# Patient Record
Sex: MALE | Race: WHITE | NOT HISPANIC OR LATINO | Employment: OTHER | URBAN - METROPOLITAN AREA
[De-identification: names, ages, dates, MRNs, and addresses within clinical notes are randomized per-mention and may not be internally consistent; named-entity substitution may affect disease eponyms.]

---

## 2018-02-06 ENCOUNTER — RECORDS - HEALTHEAST (OUTPATIENT)
Dept: LAB | Facility: CLINIC | Age: 83
End: 2018-02-06

## 2018-02-07 LAB
ANION GAP SERPL CALCULATED.3IONS-SCNC: 8 MMOL/L (ref 5–18)
BUN SERPL-MCNC: 12 MG/DL (ref 8–28)
CALCIUM SERPL-MCNC: 8.7 MG/DL (ref 8.5–10.5)
CHLORIDE BLD-SCNC: 103 MMOL/L (ref 98–107)
CO2 SERPL-SCNC: 25 MMOL/L (ref 22–31)
CREAT SERPL-MCNC: 0.77 MG/DL (ref 0.7–1.3)
GFR SERPL CREATININE-BSD FRML MDRD: >60 ML/MIN/1.73M2
GLUCOSE BLD-MCNC: 95 MG/DL (ref 70–125)
POTASSIUM BLD-SCNC: 4.5 MMOL/L (ref 3.5–5)
SODIUM SERPL-SCNC: 136 MMOL/L (ref 136–145)

## 2018-09-05 ENCOUNTER — RECORDS - HEALTHEAST (OUTPATIENT)
Dept: LAB | Facility: CLINIC | Age: 83
End: 2018-09-05

## 2018-09-05 LAB
ANION GAP SERPL CALCULATED.3IONS-SCNC: 8 MMOL/L (ref 5–18)
BUN SERPL-MCNC: 15 MG/DL (ref 8–28)
CALCIUM SERPL-MCNC: 9.2 MG/DL (ref 8.5–10.5)
CHLORIDE BLD-SCNC: 103 MMOL/L (ref 98–107)
CO2 SERPL-SCNC: 29 MMOL/L (ref 22–31)
CREAT SERPL-MCNC: 0.94 MG/DL (ref 0.7–1.3)
GFR SERPL CREATININE-BSD FRML MDRD: >60 ML/MIN/1.73M2
GLUCOSE BLD-MCNC: 151 MG/DL (ref 70–125)
POTASSIUM BLD-SCNC: 4.2 MMOL/L (ref 3.5–5)
SODIUM SERPL-SCNC: 140 MMOL/L (ref 136–145)

## 2019-11-08 ENCOUNTER — RECORDS - HEALTHEAST (OUTPATIENT)
Dept: LAB | Facility: CLINIC | Age: 84
End: 2019-11-08

## 2019-11-08 LAB — INR PPP: 1.92 (ref 0.9–1.1)

## 2019-11-11 ENCOUNTER — RECORDS - HEALTHEAST (OUTPATIENT)
Dept: LAB | Facility: CLINIC | Age: 84
End: 2019-11-11

## 2019-11-11 LAB — INR PPP: 1.97 (ref 0.9–1.1)

## 2019-11-20 ENCOUNTER — RECORDS - HEALTHEAST (OUTPATIENT)
Dept: LAB | Facility: CLINIC | Age: 84
End: 2019-11-20

## 2019-11-20 LAB — INR PPP: 2.11 (ref 0.9–1.1)

## 2019-12-04 ENCOUNTER — RECORDS - HEALTHEAST (OUTPATIENT)
Dept: LAB | Facility: CLINIC | Age: 84
End: 2019-12-04

## 2019-12-04 LAB — INR PPP: 2.02 (ref 0.9–1.1)

## 2019-12-18 ENCOUNTER — RECORDS - HEALTHEAST (OUTPATIENT)
Dept: LAB | Facility: CLINIC | Age: 84
End: 2019-12-18

## 2019-12-18 LAB — INR PPP: 2.23 (ref 0.9–1.1)

## 2020-01-02 ENCOUNTER — RECORDS - HEALTHEAST (OUTPATIENT)
Dept: LAB | Facility: CLINIC | Age: 85
End: 2020-01-02

## 2020-01-02 LAB — INR PPP: 2.29 (ref 0.9–1.1)

## 2020-01-28 ENCOUNTER — RECORDS - HEALTHEAST (OUTPATIENT)
Dept: LAB | Facility: CLINIC | Age: 85
End: 2020-01-28

## 2020-01-29 LAB — INR PPP: 2.07 (ref 0.9–1.1)

## 2020-02-26 ENCOUNTER — RECORDS - HEALTHEAST (OUTPATIENT)
Dept: LAB | Facility: CLINIC | Age: 85
End: 2020-02-26

## 2020-02-26 LAB — INR PPP: 2.76 (ref 0.9–1.1)

## 2020-03-17 ENCOUNTER — RECORDS - HEALTHEAST (OUTPATIENT)
Dept: LAB | Facility: CLINIC | Age: 85
End: 2020-03-17

## 2020-03-18 LAB
ANION GAP SERPL CALCULATED.3IONS-SCNC: 7 MMOL/L (ref 5–18)
BUN SERPL-MCNC: 14 MG/DL (ref 8–28)
CALCIUM SERPL-MCNC: 8.6 MG/DL (ref 8.5–10.5)
CHLORIDE BLD-SCNC: 104 MMOL/L (ref 98–107)
CO2 SERPL-SCNC: 27 MMOL/L (ref 22–31)
CREAT SERPL-MCNC: 0.97 MG/DL (ref 0.7–1.3)
GFR SERPL CREATININE-BSD FRML MDRD: >60 ML/MIN/1.73M2
GLUCOSE BLD-MCNC: 111 MG/DL (ref 70–125)
POTASSIUM BLD-SCNC: 4.4 MMOL/L (ref 3.5–5)
SODIUM SERPL-SCNC: 138 MMOL/L (ref 136–145)

## 2020-03-24 ENCOUNTER — RECORDS - HEALTHEAST (OUTPATIENT)
Dept: LAB | Facility: CLINIC | Age: 85
End: 2020-03-24

## 2020-03-25 LAB — INR PPP: 2.72 (ref 0.9–1.1)

## 2020-04-08 ENCOUNTER — RECORDS - HEALTHEAST (OUTPATIENT)
Dept: LAB | Facility: CLINIC | Age: 85
End: 2020-04-08

## 2020-04-08 LAB — INR PPP: 2.43 (ref 0.9–1.1)

## 2020-05-06 ENCOUNTER — RECORDS - HEALTHEAST (OUTPATIENT)
Dept: LAB | Facility: CLINIC | Age: 85
End: 2020-05-06

## 2020-05-06 LAB — INR PPP: 3.48 (ref 0.9–1.1)

## 2020-05-13 ENCOUNTER — RECORDS - HEALTHEAST (OUTPATIENT)
Dept: LAB | Facility: CLINIC | Age: 85
End: 2020-05-13

## 2020-05-13 LAB — INR PPP: 2.38 (ref 0.9–1.1)

## 2020-05-26 ENCOUNTER — RECORDS - HEALTHEAST (OUTPATIENT)
Dept: LAB | Facility: CLINIC | Age: 85
End: 2020-05-26

## 2020-05-27 LAB — INR PPP: 2.52 (ref 0.9–1.1)

## 2020-06-10 ENCOUNTER — RECORDS - HEALTHEAST (OUTPATIENT)
Dept: LAB | Facility: CLINIC | Age: 85
End: 2020-06-10

## 2020-06-10 LAB — INR PPP: 2.27 (ref 0.9–1.1)

## 2020-06-23 ENCOUNTER — RECORDS - HEALTHEAST (OUTPATIENT)
Dept: LAB | Facility: CLINIC | Age: 85
End: 2020-06-23

## 2020-06-24 LAB — INR PPP: 2.68 (ref 0.9–1.1)

## 2020-07-21 ENCOUNTER — RECORDS - HEALTHEAST (OUTPATIENT)
Dept: LAB | Facility: CLINIC | Age: 85
End: 2020-07-21

## 2020-07-22 LAB — INR PPP: 2.41 (ref 0.9–1.1)

## 2020-08-18 ENCOUNTER — RECORDS - HEALTHEAST (OUTPATIENT)
Dept: LAB | Facility: CLINIC | Age: 85
End: 2020-08-18

## 2020-08-19 LAB — INR PPP: 2.11 (ref 0.9–1.1)

## 2020-08-25 ENCOUNTER — RECORDS - HEALTHEAST (OUTPATIENT)
Dept: LAB | Facility: CLINIC | Age: 85
End: 2020-08-25

## 2020-08-26 LAB — INR PPP: 2.34 (ref 0.9–1.1)

## 2020-09-08 ENCOUNTER — RECORDS - HEALTHEAST (OUTPATIENT)
Dept: LAB | Facility: CLINIC | Age: 85
End: 2020-09-08

## 2020-09-09 LAB
ANION GAP SERPL CALCULATED.3IONS-SCNC: 8 MMOL/L (ref 5–18)
BUN SERPL-MCNC: 15 MG/DL (ref 8–28)
CALCIUM SERPL-MCNC: 8.6 MG/DL (ref 8.5–10.5)
CHLORIDE BLD-SCNC: 100 MMOL/L (ref 98–107)
CO2 SERPL-SCNC: 29 MMOL/L (ref 22–31)
CREAT SERPL-MCNC: 1 MG/DL (ref 0.7–1.3)
GFR SERPL CREATININE-BSD FRML MDRD: >60 ML/MIN/1.73M2
GLUCOSE BLD-MCNC: 107 MG/DL (ref 70–125)
INR PPP: 2.96 (ref 0.9–1.1)
POTASSIUM BLD-SCNC: 4.2 MMOL/L (ref 3.5–5)
SODIUM SERPL-SCNC: 137 MMOL/L (ref 136–145)

## 2020-09-22 ENCOUNTER — RECORDS - HEALTHEAST (OUTPATIENT)
Dept: LAB | Facility: CLINIC | Age: 85
End: 2020-09-22

## 2020-09-23 LAB — INR PPP: 3.54 (ref 0.9–1.1)

## 2020-09-29 ENCOUNTER — RECORDS - HEALTHEAST (OUTPATIENT)
Dept: LAB | Facility: CLINIC | Age: 85
End: 2020-09-29

## 2020-09-30 LAB — INR PPP: 2.53 (ref 0.9–1.1)

## 2020-10-03 ENCOUNTER — RECORDS - HEALTHEAST (OUTPATIENT)
Dept: LAB | Facility: CLINIC | Age: 85
End: 2020-10-03

## 2020-10-05 LAB — INR PPP: 2.02 (ref 0.9–1.1)

## 2020-10-09 ENCOUNTER — RECORDS - HEALTHEAST (OUTPATIENT)
Dept: LAB | Facility: CLINIC | Age: 85
End: 2020-10-09

## 2020-10-12 LAB — INR PPP: 2.24 (ref 0.9–1.1)

## 2020-10-16 ENCOUNTER — RECORDS - HEALTHEAST (OUTPATIENT)
Dept: LAB | Facility: CLINIC | Age: 85
End: 2020-10-16

## 2020-10-19 LAB — INR PPP: 2.64 (ref 0.9–1.1)

## 2020-11-10 ENCOUNTER — RECORDS - HEALTHEAST (OUTPATIENT)
Dept: LAB | Facility: CLINIC | Age: 85
End: 2020-11-10

## 2020-11-11 LAB — INR PPP: 2.79 (ref 0.9–1.1)

## 2020-11-23 ENCOUNTER — RECORDS - HEALTHEAST (OUTPATIENT)
Dept: LAB | Facility: CLINIC | Age: 85
End: 2020-11-23

## 2020-11-25 LAB — INR PPP: 3.35 (ref 0.9–1.1)

## 2020-12-01 ENCOUNTER — RECORDS - HEALTHEAST (OUTPATIENT)
Dept: LAB | Facility: CLINIC | Age: 85
End: 2020-12-01

## 2020-12-02 LAB — INR PPP: 2.57 (ref 0.9–1.1)

## 2020-12-08 ENCOUNTER — RECORDS - HEALTHEAST (OUTPATIENT)
Dept: LAB | Facility: CLINIC | Age: 85
End: 2020-12-08

## 2020-12-09 LAB — INR PPP: 2.64 (ref 0.9–1.1)

## 2020-12-15 ENCOUNTER — RECORDS - HEALTHEAST (OUTPATIENT)
Dept: LAB | Facility: CLINIC | Age: 85
End: 2020-12-15

## 2020-12-16 LAB — INR PPP: 2.19 (ref 0.9–1.1)

## 2021-01-14 ENCOUNTER — RECORDS - HEALTHEAST (OUTPATIENT)
Dept: LAB | Facility: CLINIC | Age: 86
End: 2021-01-14

## 2021-01-15 LAB — INR PPP: 2.08 (ref 0.9–1.1)

## 2021-02-05 ENCOUNTER — RECORDS - HEALTHEAST (OUTPATIENT)
Dept: LAB | Facility: CLINIC | Age: 86
End: 2021-02-05

## 2021-02-08 LAB
ANION GAP SERPL CALCULATED.3IONS-SCNC: 6 MMOL/L (ref 5–18)
BUN SERPL-MCNC: 19 MG/DL (ref 8–28)
CALCIUM SERPL-MCNC: 8.6 MG/DL (ref 8.5–10.5)
CHLORIDE BLD-SCNC: 101 MMOL/L (ref 98–107)
CO2 SERPL-SCNC: 28 MMOL/L (ref 22–31)
CREAT SERPL-MCNC: 1.08 MG/DL (ref 0.7–1.3)
GFR SERPL CREATININE-BSD FRML MDRD: >60 ML/MIN/1.73M2
GLUCOSE BLD-MCNC: 108 MG/DL (ref 70–125)
POTASSIUM BLD-SCNC: 4.4 MMOL/L (ref 3.5–5)
SODIUM SERPL-SCNC: 135 MMOL/L (ref 136–145)

## 2021-02-10 ENCOUNTER — RECORDS - HEALTHEAST (OUTPATIENT)
Dept: LAB | Facility: CLINIC | Age: 86
End: 2021-02-10

## 2021-02-12 LAB — INR PPP: 1.98 (ref 0.9–1.1)

## 2021-02-24 ENCOUNTER — RECORDS - HEALTHEAST (OUTPATIENT)
Dept: LAB | Facility: CLINIC | Age: 86
End: 2021-02-24

## 2021-02-26 LAB — INR PPP: 2.22 (ref 0.9–1.1)

## 2021-03-19 ENCOUNTER — RECORDS - HEALTHEAST (OUTPATIENT)
Dept: LAB | Facility: CLINIC | Age: 86
End: 2021-03-19

## 2021-03-19 LAB — INR PPP: 2.01 (ref 0.9–1.1)

## 2021-04-13 ENCOUNTER — RECORDS - HEALTHEAST (OUTPATIENT)
Dept: LAB | Facility: CLINIC | Age: 86
End: 2021-04-13

## 2021-04-14 LAB — INR PPP: 2.15 (ref 0.9–1.1)

## 2021-05-10 ENCOUNTER — RECORDS - HEALTHEAST (OUTPATIENT)
Dept: LAB | Facility: CLINIC | Age: 86
End: 2021-05-10

## 2021-05-12 LAB — INR PPP: 2.81 (ref 0.9–1.1)

## 2021-06-08 ENCOUNTER — RECORDS - HEALTHEAST (OUTPATIENT)
Dept: LAB | Facility: CLINIC | Age: 86
End: 2021-06-08

## 2021-06-09 LAB — INR PPP: 2.65 (ref 0.9–1.1)

## 2021-07-05 ENCOUNTER — RECORDS - HEALTHEAST (OUTPATIENT)
Dept: LAB | Facility: CLINIC | Age: 86
End: 2021-07-05

## 2021-07-07 LAB — INR PPP: 2.53 (ref 0.9–1.1)

## 2021-08-04 ENCOUNTER — LAB REQUISITION (OUTPATIENT)
Dept: LAB | Facility: CLINIC | Age: 86
End: 2021-08-04
Payer: COMMERCIAL

## 2021-08-04 LAB — INR PPP: 2.04 (ref 0.85–1.15)

## 2021-08-04 PROCEDURE — 85610 PROTHROMBIN TIME: CPT | Mod: ORL | Performed by: NURSE PRACTITIONER

## 2021-08-04 PROCEDURE — 36415 COLL VENOUS BLD VENIPUNCTURE: CPT | Mod: ORL | Performed by: NURSE PRACTITIONER

## 2021-08-04 PROCEDURE — P9604 ONE-WAY ALLOW PRORATED TRIP: HCPCS | Mod: ORL | Performed by: NURSE PRACTITIONER

## 2021-08-31 ENCOUNTER — LAB REQUISITION (OUTPATIENT)
Dept: LAB | Facility: CLINIC | Age: 86
End: 2021-08-31
Payer: COMMERCIAL

## 2021-08-31 DIAGNOSIS — I82.409 ACUTE EMBOLISM AND THROMBOSIS OF UNSPECIFIED DEEP VEINS OF UNSPECIFIED LOWER EXTREMITY (H): ICD-10-CM

## 2021-09-01 ENCOUNTER — LAB REQUISITION (OUTPATIENT)
Dept: LAB | Facility: CLINIC | Age: 86
End: 2021-09-01
Payer: COMMERCIAL

## 2021-09-01 DIAGNOSIS — Z51.81 ENCOUNTER FOR THERAPEUTIC DRUG LEVEL MONITORING: ICD-10-CM

## 2021-09-01 LAB
ANION GAP SERPL CALCULATED.3IONS-SCNC: 9 MMOL/L (ref 5–18)
BUN SERPL-MCNC: 21 MG/DL (ref 8–28)
CALCIUM SERPL-MCNC: 9.3 MG/DL (ref 8.5–10.5)
CHLORIDE BLD-SCNC: 102 MMOL/L (ref 98–107)
CO2 SERPL-SCNC: 29 MMOL/L (ref 22–31)
CREAT SERPL-MCNC: 1.14 MG/DL (ref 0.7–1.3)
ERYTHROCYTE [DISTWIDTH] IN BLOOD BY AUTOMATED COUNT: 12.6 % (ref 10–15)
GFR SERPL CREATININE-BSD FRML MDRD: 58 ML/MIN/1.73M2
GLUCOSE BLD-MCNC: 101 MG/DL (ref 70–125)
HCT VFR BLD AUTO: 42 % (ref 40–53)
HGB BLD-MCNC: 13.8 G/DL (ref 13.3–17.7)
INR PPP: 2.68 (ref 0.85–1.15)
MCH RBC QN AUTO: 32 PG (ref 26.5–33)
MCHC RBC AUTO-ENTMCNC: 32.9 G/DL (ref 31.5–36.5)
MCV RBC AUTO: 97 FL (ref 78–100)
PLATELET # BLD AUTO: 149 10E3/UL (ref 150–450)
POTASSIUM BLD-SCNC: 4.2 MMOL/L (ref 3.5–5)
RBC # BLD AUTO: 4.31 10E6/UL (ref 4.4–5.9)
SODIUM SERPL-SCNC: 140 MMOL/L (ref 136–145)
WBC # BLD AUTO: 6.8 10E3/UL (ref 4–11)

## 2021-09-01 PROCEDURE — P9604 ONE-WAY ALLOW PRORATED TRIP: HCPCS | Mod: ORL | Performed by: NURSE PRACTITIONER

## 2021-09-01 PROCEDURE — 80048 BASIC METABOLIC PNL TOTAL CA: CPT | Mod: ORL | Performed by: NURSE PRACTITIONER

## 2021-09-01 PROCEDURE — 85610 PROTHROMBIN TIME: CPT | Mod: ORL | Performed by: NURSE PRACTITIONER

## 2021-09-01 PROCEDURE — 85027 COMPLETE CBC AUTOMATED: CPT | Mod: ORL | Performed by: NURSE PRACTITIONER

## 2021-09-01 PROCEDURE — 36415 COLL VENOUS BLD VENIPUNCTURE: CPT | Mod: ORL | Performed by: NURSE PRACTITIONER

## 2021-09-18 ENCOUNTER — LAB REQUISITION (OUTPATIENT)
Dept: LAB | Facility: CLINIC | Age: 86
End: 2021-09-18
Payer: COMMERCIAL

## 2021-09-18 DIAGNOSIS — Z51.81 ENCOUNTER FOR THERAPEUTIC DRUG LEVEL MONITORING: ICD-10-CM

## 2021-09-28 ENCOUNTER — LAB REQUISITION (OUTPATIENT)
Dept: LAB | Facility: CLINIC | Age: 86
End: 2021-09-28
Payer: COMMERCIAL

## 2021-09-28 DIAGNOSIS — I82.90 ACUTE EMBOLISM AND THROMBOSIS OF UNSPECIFIED VEIN: ICD-10-CM

## 2021-09-29 LAB — INR PPP: 3.22 (ref 0.85–1.15)

## 2021-09-29 PROCEDURE — P9604 ONE-WAY ALLOW PRORATED TRIP: HCPCS | Mod: ORL | Performed by: NURSE PRACTITIONER

## 2021-09-29 PROCEDURE — 36415 COLL VENOUS BLD VENIPUNCTURE: CPT | Mod: ORL | Performed by: NURSE PRACTITIONER

## 2021-09-29 PROCEDURE — 85610 PROTHROMBIN TIME: CPT | Mod: ORL | Performed by: NURSE PRACTITIONER

## 2021-10-12 ENCOUNTER — LAB REQUISITION (OUTPATIENT)
Dept: LAB | Facility: CLINIC | Age: 86
End: 2021-10-12
Payer: COMMERCIAL

## 2021-10-12 DIAGNOSIS — I82.90 ACUTE EMBOLISM AND THROMBOSIS OF UNSPECIFIED VEIN: ICD-10-CM

## 2021-10-13 LAB — INR PPP: 2.44 (ref 0.85–1.15)

## 2021-10-13 PROCEDURE — 36415 COLL VENOUS BLD VENIPUNCTURE: CPT | Mod: ORL | Performed by: NURSE PRACTITIONER

## 2021-10-13 PROCEDURE — 85610 PROTHROMBIN TIME: CPT | Mod: ORL | Performed by: NURSE PRACTITIONER

## 2021-10-13 PROCEDURE — P9604 ONE-WAY ALLOW PRORATED TRIP: HCPCS | Mod: ORL | Performed by: NURSE PRACTITIONER

## 2021-10-25 ENCOUNTER — LAB REQUISITION (OUTPATIENT)
Dept: LAB | Facility: CLINIC | Age: 86
End: 2021-10-25
Payer: COMMERCIAL

## 2021-10-25 DIAGNOSIS — I82.90 ACUTE EMBOLISM AND THROMBOSIS OF UNSPECIFIED VEIN: ICD-10-CM

## 2021-10-25 DIAGNOSIS — I82.409 ACUTE EMBOLISM AND THROMBOSIS OF UNSPECIFIED DEEP VEINS OF UNSPECIFIED LOWER EXTREMITY (H): ICD-10-CM

## 2021-10-27 LAB — INR PPP: 1.55 (ref 0.85–1.15)

## 2021-10-27 PROCEDURE — P9604 ONE-WAY ALLOW PRORATED TRIP: HCPCS | Mod: ORL | Performed by: NURSE PRACTITIONER

## 2021-10-27 PROCEDURE — 85610 PROTHROMBIN TIME: CPT | Mod: ORL | Performed by: NURSE PRACTITIONER

## 2021-10-27 PROCEDURE — 36415 COLL VENOUS BLD VENIPUNCTURE: CPT | Mod: ORL | Performed by: NURSE PRACTITIONER

## 2021-11-02 ENCOUNTER — LAB REQUISITION (OUTPATIENT)
Dept: LAB | Facility: CLINIC | Age: 86
End: 2021-11-02
Payer: COMMERCIAL

## 2021-11-02 DIAGNOSIS — I82.409 ACUTE EMBOLISM AND THROMBOSIS OF UNSPECIFIED DEEP VEINS OF UNSPECIFIED LOWER EXTREMITY (H): ICD-10-CM

## 2021-11-03 LAB — INR PPP: 1.49 (ref 0.85–1.15)

## 2021-11-03 PROCEDURE — 85610 PROTHROMBIN TIME: CPT | Mod: ORL | Performed by: NURSE PRACTITIONER

## 2021-11-03 PROCEDURE — P9604 ONE-WAY ALLOW PRORATED TRIP: HCPCS | Mod: ORL | Performed by: NURSE PRACTITIONER

## 2021-11-03 PROCEDURE — 36415 COLL VENOUS BLD VENIPUNCTURE: CPT | Mod: ORL | Performed by: NURSE PRACTITIONER

## 2021-11-09 ENCOUNTER — LAB REQUISITION (OUTPATIENT)
Dept: LAB | Facility: CLINIC | Age: 86
End: 2021-11-09
Payer: COMMERCIAL

## 2021-11-09 DIAGNOSIS — I82.592 CHRONIC EMBOLISM AND THROMBOSIS OF OTHER SPECIFIED DEEP VEIN OF LEFT LOWER EXTREMITY (H): ICD-10-CM

## 2021-11-10 LAB — INR PPP: 1.7 (ref 0.85–1.15)

## 2021-11-10 PROCEDURE — 36415 COLL VENOUS BLD VENIPUNCTURE: CPT | Mod: ORL | Performed by: NURSE PRACTITIONER

## 2021-11-10 PROCEDURE — 85610 PROTHROMBIN TIME: CPT | Mod: ORL | Performed by: NURSE PRACTITIONER

## 2021-11-10 PROCEDURE — P9603 ONE-WAY ALLOW PRORATED MILES: HCPCS | Mod: ORL | Performed by: NURSE PRACTITIONER

## 2021-11-16 ENCOUNTER — LAB REQUISITION (OUTPATIENT)
Dept: LAB | Facility: CLINIC | Age: 86
End: 2021-11-16
Payer: COMMERCIAL

## 2021-11-16 DIAGNOSIS — I82.409 ACUTE EMBOLISM AND THROMBOSIS OF UNSPECIFIED DEEP VEINS OF UNSPECIFIED LOWER EXTREMITY (H): ICD-10-CM

## 2021-11-17 LAB — INR PPP: 1.75 (ref 0.85–1.15)

## 2021-11-17 PROCEDURE — 85610 PROTHROMBIN TIME: CPT | Mod: ORL | Performed by: NURSE PRACTITIONER

## 2021-11-17 PROCEDURE — 36415 COLL VENOUS BLD VENIPUNCTURE: CPT | Mod: ORL | Performed by: NURSE PRACTITIONER

## 2021-11-17 PROCEDURE — P9604 ONE-WAY ALLOW PRORATED TRIP: HCPCS | Mod: ORL | Performed by: NURSE PRACTITIONER

## 2021-11-20 ENCOUNTER — LAB REQUISITION (OUTPATIENT)
Dept: LAB | Facility: CLINIC | Age: 86
End: 2021-11-20
Payer: COMMERCIAL

## 2021-11-20 DIAGNOSIS — I82.592 CHRONIC EMBOLISM AND THROMBOSIS OF OTHER SPECIFIED DEEP VEIN OF LEFT LOWER EXTREMITY (H): ICD-10-CM

## 2021-11-21 ENCOUNTER — LAB REQUISITION (OUTPATIENT)
Dept: LAB | Facility: CLINIC | Age: 86
End: 2021-11-21
Payer: COMMERCIAL

## 2021-11-21 DIAGNOSIS — I48.20 CHRONIC ATRIAL FIBRILLATION, UNSPECIFIED (H): ICD-10-CM

## 2021-11-22 LAB — INR PPP: 1.64 (ref 0.85–1.15)

## 2021-11-22 PROCEDURE — 36415 COLL VENOUS BLD VENIPUNCTURE: CPT | Mod: ORL | Performed by: INTERNAL MEDICINE

## 2021-11-22 PROCEDURE — 85610 PROTHROMBIN TIME: CPT | Mod: ORL | Performed by: INTERNAL MEDICINE

## 2021-11-22 PROCEDURE — P9604 ONE-WAY ALLOW PRORATED TRIP: HCPCS | Mod: ORL | Performed by: INTERNAL MEDICINE

## 2021-11-26 ENCOUNTER — LAB REQUISITION (OUTPATIENT)
Dept: LAB | Facility: CLINIC | Age: 86
End: 2021-11-26
Payer: COMMERCIAL

## 2021-11-26 DIAGNOSIS — Z79.01 LONG TERM (CURRENT) USE OF ANTICOAGULANTS: ICD-10-CM

## 2021-11-29 LAB — INR PPP: 2.18 (ref 0.85–1.15)

## 2021-11-29 PROCEDURE — 36415 COLL VENOUS BLD VENIPUNCTURE: CPT | Mod: ORL | Performed by: NURSE PRACTITIONER

## 2021-11-29 PROCEDURE — 85610 PROTHROMBIN TIME: CPT | Mod: ORL | Performed by: NURSE PRACTITIONER

## 2021-11-29 PROCEDURE — P9603 ONE-WAY ALLOW PRORATED MILES: HCPCS | Mod: ORL | Performed by: NURSE PRACTITIONER

## 2021-12-06 ENCOUNTER — LAB REQUISITION (OUTPATIENT)
Dept: LAB | Facility: CLINIC | Age: 86
End: 2021-12-06
Payer: COMMERCIAL

## 2021-12-06 DIAGNOSIS — I82.592 CHRONIC EMBOLISM AND THROMBOSIS OF OTHER SPECIFIED DEEP VEIN OF LEFT LOWER EXTREMITY (H): ICD-10-CM

## 2021-12-08 LAB — INR PPP: 3.01 (ref 0.85–1.15)

## 2021-12-08 PROCEDURE — 85610 PROTHROMBIN TIME: CPT | Mod: ORL | Performed by: NURSE PRACTITIONER

## 2021-12-08 PROCEDURE — P9604 ONE-WAY ALLOW PRORATED TRIP: HCPCS | Mod: ORL | Performed by: NURSE PRACTITIONER

## 2021-12-08 PROCEDURE — 36415 COLL VENOUS BLD VENIPUNCTURE: CPT | Mod: ORL | Performed by: NURSE PRACTITIONER

## 2021-12-10 ENCOUNTER — LAB REQUISITION (OUTPATIENT)
Dept: LAB | Facility: CLINIC | Age: 86
End: 2021-12-10
Payer: COMMERCIAL

## 2021-12-10 DIAGNOSIS — Z79.01 LONG TERM (CURRENT) USE OF ANTICOAGULANTS: ICD-10-CM

## 2021-12-12 ENCOUNTER — LAB REQUISITION (OUTPATIENT)
Dept: LAB | Facility: CLINIC | Age: 86
End: 2021-12-12
Payer: COMMERCIAL

## 2021-12-12 DIAGNOSIS — I50.32 CHRONIC DIASTOLIC (CONGESTIVE) HEART FAILURE (H): ICD-10-CM

## 2021-12-13 LAB — INR PPP: 2.58 (ref 0.85–1.15)

## 2021-12-13 PROCEDURE — 36415 COLL VENOUS BLD VENIPUNCTURE: CPT | Mod: ORL | Performed by: NURSE PRACTITIONER

## 2021-12-13 PROCEDURE — P9603 ONE-WAY ALLOW PRORATED MILES: HCPCS | Mod: ORL | Performed by: NURSE PRACTITIONER

## 2021-12-13 PROCEDURE — 85610 PROTHROMBIN TIME: CPT | Mod: ORL | Performed by: NURSE PRACTITIONER

## 2021-12-16 ENCOUNTER — LAB REQUISITION (OUTPATIENT)
Dept: LAB | Facility: CLINIC | Age: 86
End: 2021-12-16
Payer: COMMERCIAL

## 2021-12-20 LAB — INR PPP: 2.62 (ref 0.85–1.15)

## 2021-12-20 PROCEDURE — P9604 ONE-WAY ALLOW PRORATED TRIP: HCPCS | Mod: ORL | Performed by: NURSE PRACTITIONER

## 2021-12-20 PROCEDURE — 85610 PROTHROMBIN TIME: CPT | Mod: ORL | Performed by: NURSE PRACTITIONER

## 2021-12-20 PROCEDURE — 36415 COLL VENOUS BLD VENIPUNCTURE: CPT | Mod: ORL | Performed by: NURSE PRACTITIONER

## 2021-12-23 ENCOUNTER — LAB REQUISITION (OUTPATIENT)
Dept: LAB | Facility: CLINIC | Age: 86
End: 2021-12-23
Payer: COMMERCIAL

## 2021-12-23 DIAGNOSIS — I82.90 ACUTE EMBOLISM AND THROMBOSIS OF UNSPECIFIED VEIN: ICD-10-CM

## 2021-12-27 PROCEDURE — P9603 ONE-WAY ALLOW PRORATED MILES: HCPCS | Mod: ORL | Performed by: NURSE PRACTITIONER

## 2021-12-27 PROCEDURE — 36415 COLL VENOUS BLD VENIPUNCTURE: CPT | Mod: ORL | Performed by: NURSE PRACTITIONER

## 2021-12-27 PROCEDURE — 85610 PROTHROMBIN TIME: CPT | Mod: ORL | Performed by: NURSE PRACTITIONER

## 2021-12-28 LAB — INR PPP: 2.34 (ref 0.85–1.15)

## 2022-01-01 ENCOUNTER — LAB REQUISITION (OUTPATIENT)
Dept: LAB | Facility: CLINIC | Age: 87
End: 2022-01-01
Payer: COMMERCIAL

## 2022-01-01 ENCOUNTER — LAB REQUISITION (OUTPATIENT)
Dept: LAB | Facility: CLINIC | Age: 87
End: 2022-01-01
Payer: OTHER MISCELLANEOUS

## 2022-01-01 ENCOUNTER — DOCUMENTATION ONLY (OUTPATIENT)
Dept: OTHER | Facility: CLINIC | Age: 87
End: 2022-01-01

## 2022-01-01 ENCOUNTER — APPOINTMENT (OUTPATIENT)
Dept: CT IMAGING | Facility: HOSPITAL | Age: 87
DRG: 682 | End: 2022-01-01
Attending: EMERGENCY MEDICINE
Payer: COMMERCIAL

## 2022-01-01 ENCOUNTER — APPOINTMENT (OUTPATIENT)
Dept: CARDIOLOGY | Facility: HOSPITAL | Age: 87
DRG: 682 | End: 2022-01-01
Attending: INTERNAL MEDICINE
Payer: COMMERCIAL

## 2022-01-01 ENCOUNTER — APPOINTMENT (OUTPATIENT)
Dept: RADIOLOGY | Facility: HOSPITAL | Age: 87
DRG: 682 | End: 2022-01-01
Attending: EMERGENCY MEDICINE
Payer: COMMERCIAL

## 2022-01-01 ENCOUNTER — HOSPITAL ENCOUNTER (INPATIENT)
Facility: HOSPITAL | Age: 87
LOS: 5 days | Discharge: HOSPICE | DRG: 682 | End: 2022-11-28
Attending: EMERGENCY MEDICINE | Admitting: INTERNAL MEDICINE
Payer: COMMERCIAL

## 2022-01-01 VITALS
OXYGEN SATURATION: 98 % | HEIGHT: 67 IN | BODY MASS INDEX: 27.56 KG/M2 | HEART RATE: 58 BPM | WEIGHT: 175.6 LBS | TEMPERATURE: 98.5 F | RESPIRATION RATE: 22 BRPM | DIASTOLIC BLOOD PRESSURE: 70 MMHG | SYSTOLIC BLOOD PRESSURE: 165 MMHG

## 2022-01-01 DIAGNOSIS — I82.592 CHRONIC EMBOLISM AND THROMBOSIS OF OTHER SPECIFIED DEEP VEIN OF LEFT LOWER EXTREMITY (H): ICD-10-CM

## 2022-01-01 DIAGNOSIS — Z51.81 ENCOUNTER FOR THERAPEUTIC DRUG LEVEL MONITORING: ICD-10-CM

## 2022-01-01 DIAGNOSIS — I82.401 ACUTE EMBOLISM AND THROMBOSIS OF UNSPECIFIED DEEP VEINS OF RIGHT LOWER EXTREMITY (H): ICD-10-CM

## 2022-01-01 DIAGNOSIS — E87.0 HYPERNATREMIA: ICD-10-CM

## 2022-01-01 DIAGNOSIS — I48.0 PAROXYSMAL ATRIAL FIBRILLATION (H): ICD-10-CM

## 2022-01-01 DIAGNOSIS — Z86.718 PERSONAL HISTORY OF OTHER VENOUS THROMBOSIS AND EMBOLISM: ICD-10-CM

## 2022-01-01 DIAGNOSIS — D64.9 ANEMIA, UNSPECIFIED: ICD-10-CM

## 2022-01-01 DIAGNOSIS — E87.1 HYPO-OSMOLALITY AND HYPONATREMIA: ICD-10-CM

## 2022-01-01 DIAGNOSIS — Z79.01 LONG TERM (CURRENT) USE OF ANTICOAGULANTS: ICD-10-CM

## 2022-01-01 DIAGNOSIS — F03.90 UNSPECIFIED DEMENTIA, UNSPECIFIED SEVERITY, WITHOUT BEHAVIORAL DISTURBANCE, PSYCHOTIC DISTURBANCE, MOOD DISTURBANCE, AND ANXIETY (H): ICD-10-CM

## 2022-01-01 DIAGNOSIS — N17.9 ACUTE RENAL FAILURE SUPERIMPOSED ON CHRONIC KIDNEY DISEASE, UNSPECIFIED CKD STAGE, UNSPECIFIED ACUTE RENAL FAILURE TYPE: ICD-10-CM

## 2022-01-01 DIAGNOSIS — I48.20 CHRONIC ATRIAL FIBRILLATION, UNSPECIFIED (H): ICD-10-CM

## 2022-01-01 DIAGNOSIS — I67.9 CEREBROVASCULAR DISEASE, UNSPECIFIED: ICD-10-CM

## 2022-01-01 DIAGNOSIS — I10 ESSENTIAL (PRIMARY) HYPERTENSION: ICD-10-CM

## 2022-01-01 DIAGNOSIS — N18.9 ACUTE RENAL FAILURE SUPERIMPOSED ON CHRONIC KIDNEY DISEASE, UNSPECIFIED CKD STAGE, UNSPECIFIED ACUTE RENAL FAILURE TYPE: ICD-10-CM

## 2022-01-01 DIAGNOSIS — E86.0 DEHYDRATION: ICD-10-CM

## 2022-01-01 LAB
ALBUMIN SERPL BCG-MCNC: 3.1 G/DL (ref 3.5–5.2)
ALBUMIN SERPL BCG-MCNC: 3.3 G/DL (ref 3.5–5.2)
ALBUMIN SERPL BCG-MCNC: 3.4 G/DL (ref 3.5–5.2)
ALBUMIN SERPL BCG-MCNC: 4.2 G/DL (ref 3.5–5.2)
ALBUMIN UR-MCNC: NEGATIVE MG/DL
ALP SERPL-CCNC: 102 U/L (ref 40–129)
ALP SERPL-CCNC: 102 U/L (ref 40–129)
ALP SERPL-CCNC: 105 U/L (ref 40–129)
ALP SERPL-CCNC: 135 U/L (ref 40–129)
ALT SERPL W P-5'-P-CCNC: 112 U/L (ref 10–50)
ALT SERPL W P-5'-P-CCNC: 71 U/L (ref 10–50)
ALT SERPL W P-5'-P-CCNC: 76 U/L (ref 10–50)
ALT SERPL W P-5'-P-CCNC: 81 U/L (ref 10–50)
ANION GAP SERPL CALCULATED.3IONS-SCNC: 11 MMOL/L (ref 7–15)
ANION GAP SERPL CALCULATED.3IONS-SCNC: 13 MMOL/L (ref 7–15)
ANION GAP SERPL CALCULATED.3IONS-SCNC: 7 MMOL/L (ref 7–15)
ANION GAP SERPL CALCULATED.3IONS-SCNC: 8 MMOL/L (ref 7–15)
ANION GAP SERPL CALCULATED.3IONS-SCNC: 9 MMOL/L (ref 5–18)
ANION GAP SERPL CALCULATED.3IONS-SCNC: 9 MMOL/L (ref 7–15)
APPEARANCE UR: CLEAR
AST SERPL W P-5'-P-CCNC: 34 U/L (ref 10–50)
AST SERPL W P-5'-P-CCNC: 36 U/L (ref 10–50)
AST SERPL W P-5'-P-CCNC: 39 U/L (ref 10–50)
AST SERPL W P-5'-P-CCNC: 49 U/L (ref 10–50)
BASOPHILS # BLD AUTO: 0.1 10E3/UL (ref 0–0.2)
BASOPHILS # BLD AUTO: 0.1 10E3/UL (ref 0–0.2)
BASOPHILS NFR BLD AUTO: 0 %
BASOPHILS NFR BLD AUTO: 1 %
BILIRUB DIRECT SERPL-MCNC: <0.2 MG/DL (ref 0–0.3)
BILIRUB SERPL-MCNC: 0.3 MG/DL
BILIRUB SERPL-MCNC: 0.4 MG/DL
BILIRUB SERPL-MCNC: 0.4 MG/DL
BILIRUB SERPL-MCNC: 0.5 MG/DL
BILIRUB UR QL STRIP: NEGATIVE
BUN SERPL-MCNC: 26.6 MG/DL (ref 8–23)
BUN SERPL-MCNC: 27.5 MG/DL (ref 8–23)
BUN SERPL-MCNC: 28 MG/DL (ref 8–28)
BUN SERPL-MCNC: 31.1 MG/DL (ref 8–23)
BUN SERPL-MCNC: 33.9 MG/DL (ref 8–23)
BUN SERPL-MCNC: 48.9 MG/DL (ref 8–23)
BUN SERPL-MCNC: 57.2 MG/DL (ref 8–23)
CALCIUM SERPL-MCNC: 8.6 MG/DL (ref 8.5–10.5)
CALCIUM SERPL-MCNC: 8.7 MG/DL (ref 8.8–10.2)
CALCIUM SERPL-MCNC: 8.7 MG/DL (ref 8.8–10.2)
CALCIUM SERPL-MCNC: 8.8 MG/DL (ref 8.8–10.2)
CALCIUM SERPL-MCNC: 8.9 MG/DL (ref 8.8–10.2)
CALCIUM SERPL-MCNC: 9 MG/DL (ref 8.8–10.2)
CALCIUM SERPL-MCNC: 9.8 MG/DL (ref 8.8–10.2)
CHLORIDE BLD-SCNC: 103 MMOL/L (ref 98–107)
CHLORIDE SERPL-SCNC: 100 MMOL/L (ref 98–107)
CHLORIDE SERPL-SCNC: 104 MMOL/L (ref 98–107)
CHLORIDE SERPL-SCNC: 112 MMOL/L (ref 98–107)
CHLORIDE SERPL-SCNC: 113 MMOL/L (ref 98–107)
CHLORIDE SERPL-SCNC: 114 MMOL/L (ref 98–107)
CHLORIDE SERPL-SCNC: 115 MMOL/L (ref 98–107)
CHLORIDE SERPL-SCNC: 115 MMOL/L (ref 98–107)
CO2 SERPL-SCNC: 26 MMOL/L (ref 22–31)
COLOR UR AUTO: YELLOW
CREAT SERPL-MCNC: 1.12 MG/DL (ref 0.67–1.17)
CREAT SERPL-MCNC: 1.13 MG/DL (ref 0.7–1.3)
CREAT SERPL-MCNC: 1.14 MG/DL (ref 0.67–1.17)
CREAT SERPL-MCNC: 1.19 MG/DL (ref 0.67–1.17)
CREAT SERPL-MCNC: 1.24 MG/DL (ref 0.67–1.17)
CREAT SERPL-MCNC: 1.61 MG/DL (ref 0.67–1.17)
CREAT SERPL-MCNC: 1.86 MG/DL (ref 0.67–1.17)
DEPRECATED HCO3 PLAS-SCNC: 23 MMOL/L (ref 22–29)
DEPRECATED HCO3 PLAS-SCNC: 24 MMOL/L (ref 22–29)
DEPRECATED HCO3 PLAS-SCNC: 26 MMOL/L (ref 22–29)
DEPRECATED HCO3 PLAS-SCNC: 27 MMOL/L (ref 22–29)
DEPRECATED HCO3 PLAS-SCNC: 28 MMOL/L (ref 22–29)
EOSINOPHIL # BLD AUTO: 0.2 10E3/UL (ref 0–0.7)
EOSINOPHIL # BLD AUTO: 0.3 10E3/UL (ref 0–0.7)
EOSINOPHIL NFR BLD AUTO: 1 %
EOSINOPHIL NFR BLD AUTO: 4 %
ERYTHROCYTE [DISTWIDTH] IN BLOOD BY AUTOMATED COUNT: 12.3 % (ref 10–15)
ERYTHROCYTE [DISTWIDTH] IN BLOOD BY AUTOMATED COUNT: 12.8 % (ref 10–15)
ERYTHROCYTE [DISTWIDTH] IN BLOOD BY AUTOMATED COUNT: 12.8 % (ref 10–15)
ERYTHROCYTE [DISTWIDTH] IN BLOOD BY AUTOMATED COUNT: 13 % (ref 10–15)
ERYTHROCYTE [DISTWIDTH] IN BLOOD BY AUTOMATED COUNT: 13.2 % (ref 10–15)
ERYTHROCYTE [DISTWIDTH] IN BLOOD BY AUTOMATED COUNT: 13.2 % (ref 10–15)
FLUAV AG SPEC QL IA: NEGATIVE
FLUBV AG SPEC QL IA: NEGATIVE
GFR SERPL CREATININE-BSD FRML MDRD: 35 ML/MIN/1.73M2
GFR SERPL CREATININE-BSD FRML MDRD: 41 ML/MIN/1.73M2
GFR SERPL CREATININE-BSD FRML MDRD: 56 ML/MIN/1.73M2
GFR SERPL CREATININE-BSD FRML MDRD: 59 ML/MIN/1.73M2
GFR SERPL CREATININE-BSD FRML MDRD: 62 ML/MIN/1.73M2
GFR SERPL CREATININE-BSD FRML MDRD: 63 ML/MIN/1.73M2
GFR SERPL CREATININE-BSD FRML MDRD: 64 ML/MIN/1.73M2
GLUCOSE BLD-MCNC: 95 MG/DL (ref 70–125)
GLUCOSE SERPL-MCNC: 106 MG/DL (ref 70–99)
GLUCOSE SERPL-MCNC: 119 MG/DL (ref 70–99)
GLUCOSE SERPL-MCNC: 139 MG/DL (ref 70–99)
GLUCOSE SERPL-MCNC: 141 MG/DL (ref 70–99)
GLUCOSE SERPL-MCNC: 174 MG/DL (ref 70–99)
GLUCOSE SERPL-MCNC: 92 MG/DL (ref 70–99)
GLUCOSE UR STRIP-MCNC: NEGATIVE MG/DL
HCT VFR BLD AUTO: 37.6 % (ref 40–53)
HCT VFR BLD AUTO: 38.4 % (ref 40–53)
HCT VFR BLD AUTO: 40 % (ref 40–53)
HCT VFR BLD AUTO: 40.4 % (ref 40–53)
HCT VFR BLD AUTO: 40.7 % (ref 40–53)
HCT VFR BLD AUTO: 51.1 % (ref 40–53)
HGB BLD-MCNC: 11.7 G/DL (ref 13.3–17.7)
HGB BLD-MCNC: 12.4 G/DL (ref 13.3–17.7)
HGB BLD-MCNC: 12.6 G/DL (ref 13.3–17.7)
HGB BLD-MCNC: 12.7 G/DL (ref 13.3–17.7)
HGB BLD-MCNC: 15.6 G/DL (ref 13.3–17.7)
HGB UR QL STRIP: NEGATIVE
HOLD SPECIMEN: NORMAL
HYALINE CASTS: 5 /LPF
IMM GRANULOCYTES # BLD: 0.1 10E3/UL
IMM GRANULOCYTES # BLD: 0.1 10E3/UL
IMM GRANULOCYTES NFR BLD: 1 %
IMM GRANULOCYTES NFR BLD: 1 %
INR PPP: 1.86 (ref 0.85–1.15)
INR PPP: 1.87 (ref 0.85–1.15)
INR PPP: 1.88 (ref 0.85–1.15)
INR PPP: 1.93 (ref 0.85–1.15)
INR PPP: 2.08 (ref 0.85–1.15)
INR PPP: 2.15 (ref 0.85–1.15)
INR PPP: 2.2 (ref 0.85–1.15)
INR PPP: 2.32 (ref 0.85–1.15)
INR PPP: 2.33 (ref 0.85–1.15)
INR PPP: 2.38 (ref 0.85–1.15)
INR PPP: 2.39 (ref 0.85–1.15)
INR PPP: 2.44 (ref 0.85–1.15)
INR PPP: 2.51 (ref 0.85–1.15)
INR PPP: 2.51 (ref 0.85–1.15)
INR PPP: 2.55 (ref 0.85–1.15)
INR PPP: 2.58 (ref 0.85–1.15)
INR PPP: 2.66 (ref 0.85–1.15)
INR PPP: 2.67 (ref 0.85–1.15)
INR PPP: 2.68 (ref 0.85–1.15)
INR PPP: 2.77 (ref 0.85–1.15)
INR PPP: 2.82 (ref 0.85–1.15)
INR PPP: 2.98 (ref 0.85–1.15)
INR PPP: 3.18 (ref 0.85–1.15)
INR PPP: 3.26 (ref 0.85–1.15)
INR PPP: 3.52 (ref 0.85–1.15)
INR PPP: 3.52 (ref 0.85–1.15)
INR PPP: 3.54 (ref 0.85–1.15)
INR PPP: 3.62 (ref 0.85–1.15)
INR PPP: 6.13 (ref 0.85–1.15)
KETONES UR STRIP-MCNC: NEGATIVE MG/DL
LEUKOCYTE ESTERASE UR QL STRIP: NEGATIVE
LVEF ECHO: NORMAL
LYMPHOCYTES # BLD AUTO: 1.9 10E3/UL (ref 0.8–5.3)
LYMPHOCYTES # BLD AUTO: 2.2 10E3/UL (ref 0.8–5.3)
LYMPHOCYTES NFR BLD AUTO: 16 %
LYMPHOCYTES NFR BLD AUTO: 25 %
MAGNESIUM SERPL-MCNC: 2.1 MG/DL (ref 1.7–2.3)
MAGNESIUM SERPL-MCNC: 2.2 MG/DL (ref 1.7–2.3)
MAGNESIUM SERPL-MCNC: 2.7 MG/DL (ref 1.7–2.3)
MCH RBC QN AUTO: 31.7 PG (ref 26.5–33)
MCH RBC QN AUTO: 31.7 PG (ref 26.5–33)
MCH RBC QN AUTO: 32 PG (ref 26.5–33)
MCH RBC QN AUTO: 32 PG (ref 26.5–33)
MCH RBC QN AUTO: 32.5 PG (ref 26.5–33)
MCH RBC QN AUTO: 32.5 PG (ref 26.5–33)
MCHC RBC AUTO-ENTMCNC: 30.5 G/DL (ref 31.5–36.5)
MCHC RBC AUTO-ENTMCNC: 30.7 G/DL (ref 31.5–36.5)
MCHC RBC AUTO-ENTMCNC: 31 G/DL (ref 31.5–36.5)
MCHC RBC AUTO-ENTMCNC: 31.1 G/DL (ref 31.5–36.5)
MCHC RBC AUTO-ENTMCNC: 31.8 G/DL (ref 31.5–36.5)
MCHC RBC AUTO-ENTMCNC: 32.3 G/DL (ref 31.5–36.5)
MCV RBC AUTO: 101 FL (ref 78–100)
MCV RBC AUTO: 102 FL (ref 78–100)
MCV RBC AUTO: 102 FL (ref 78–100)
MCV RBC AUTO: 103 FL (ref 78–100)
MCV RBC AUTO: 104 FL (ref 78–100)
MCV RBC AUTO: 105 FL (ref 78–100)
MONOCYTES # BLD AUTO: 0.8 10E3/UL (ref 0–1.3)
MONOCYTES # BLD AUTO: 0.9 10E3/UL (ref 0–1.3)
MONOCYTES NFR BLD AUTO: 10 %
MONOCYTES NFR BLD AUTO: 7 %
MUCOUS THREADS #/AREA URNS LPF: PRESENT /LPF
NEUTROPHILS # BLD AUTO: 5.4 10E3/UL (ref 1.6–8.3)
NEUTROPHILS # BLD AUTO: 8.7 10E3/UL (ref 1.6–8.3)
NEUTROPHILS NFR BLD AUTO: 59 %
NEUTROPHILS NFR BLD AUTO: 75 %
NITRATE UR QL: NEGATIVE
NRBC # BLD AUTO: 0 10E3/UL
NRBC # BLD AUTO: 0 10E3/UL
NRBC BLD AUTO-RTO: 0 /100
NRBC BLD AUTO-RTO: 0 /100
PH UR STRIP: 5 [PH] (ref 5–7)
PLATELET # BLD AUTO: 142 10E3/UL (ref 150–450)
PLATELET # BLD AUTO: 145 10E3/UL (ref 150–450)
PLATELET # BLD AUTO: 168 10E3/UL (ref 150–450)
PLATELET # BLD AUTO: 169 10E3/UL (ref 150–450)
PLATELET # BLD AUTO: 192 10E3/UL (ref 150–450)
PLATELET # BLD AUTO: 258 10E3/UL (ref 150–450)
POTASSIUM BLD-SCNC: 4.5 MMOL/L (ref 3.5–5)
POTASSIUM SERPL-SCNC: 3.9 MMOL/L (ref 3.4–5.3)
POTASSIUM SERPL-SCNC: 4.1 MMOL/L (ref 3.4–5.3)
POTASSIUM SERPL-SCNC: 4.1 MMOL/L (ref 3.4–5.3)
POTASSIUM SERPL-SCNC: 4.2 MMOL/L (ref 3.4–5.3)
POTASSIUM SERPL-SCNC: 4.3 MMOL/L (ref 3.4–5.3)
POTASSIUM SERPL-SCNC: 4.5 MMOL/L (ref 3.4–5.3)
POTASSIUM SERPL-SCNC: 4.7 MMOL/L (ref 3.4–5.3)
PROT SERPL-MCNC: 5.9 G/DL (ref 6.4–8.3)
PROT SERPL-MCNC: 6.1 G/DL (ref 6.4–8.3)
PROT SERPL-MCNC: 6.1 G/DL (ref 6.4–8.3)
PROT SERPL-MCNC: 7.7 G/DL (ref 6.4–8.3)
RBC # BLD AUTO: 3.69 10E6/UL (ref 4.4–5.9)
RBC # BLD AUTO: 3.81 10E6/UL (ref 4.4–5.9)
RBC # BLD AUTO: 3.88 10E6/UL (ref 4.4–5.9)
RBC # BLD AUTO: 3.91 10E6/UL (ref 4.4–5.9)
RBC # BLD AUTO: 3.97 10E6/UL (ref 4.4–5.9)
RBC # BLD AUTO: 4.87 10E6/UL (ref 4.4–5.9)
RBC URINE: 2 /HPF
SODIUM SERPL-SCNC: 132 MMOL/L (ref 136–145)
SODIUM SERPL-SCNC: 138 MMOL/L (ref 136–145)
SODIUM SERPL-SCNC: 139 MMOL/L (ref 136–145)
SODIUM SERPL-SCNC: 142 MMOL/L (ref 136–145)
SODIUM SERPL-SCNC: 143 MMOL/L (ref 136–145)
SODIUM SERPL-SCNC: 144 MMOL/L (ref 136–145)
SODIUM SERPL-SCNC: 146 MMOL/L (ref 136–145)
SODIUM SERPL-SCNC: 147 MMOL/L (ref 136–145)
SODIUM SERPL-SCNC: 148 MMOL/L (ref 136–145)
SODIUM SERPL-SCNC: 149 MMOL/L (ref 136–145)
SODIUM SERPL-SCNC: 150 MMOL/L (ref 136–145)
SODIUM SERPL-SCNC: 150 MMOL/L (ref 136–145)
SODIUM SERPL-SCNC: 154 MMOL/L (ref 136–145)
SODIUM SERPL-SCNC: 155 MMOL/L (ref 136–145)
SP GR UR STRIP: 1.03 (ref 1–1.03)
SQUAMOUS EPITHELIAL: <1 /HPF
TROPONIN T SERPL HS-MCNC: 59 NG/L
TROPONIN T SERPL HS-MCNC: 65 NG/L
UROBILINOGEN UR STRIP-MCNC: <2 MG/DL
WBC # BLD AUTO: 11.6 10E3/UL (ref 4–11)
WBC # BLD AUTO: 6.3 10E3/UL (ref 4–11)
WBC # BLD AUTO: 6.5 10E3/UL (ref 4–11)
WBC # BLD AUTO: 7.3 10E3/UL (ref 4–11)
WBC # BLD AUTO: 7.8 10E3/UL (ref 4–11)
WBC # BLD AUTO: 8.9 10E3/UL (ref 4–11)
WBC URINE: 2 /HPF

## 2022-01-01 PROCEDURE — P9604 ONE-WAY ALLOW PRORATED TRIP: HCPCS | Mod: ORL | Performed by: NURSE PRACTITIONER

## 2022-01-01 PROCEDURE — 36415 COLL VENOUS BLD VENIPUNCTURE: CPT | Mod: ORL | Performed by: NURSE PRACTITIONER

## 2022-01-01 PROCEDURE — 258N000003 HC RX IP 258 OP 636: Performed by: INTERNAL MEDICINE

## 2022-01-01 PROCEDURE — 83735 ASSAY OF MAGNESIUM: CPT | Performed by: INTERNAL MEDICINE

## 2022-01-01 PROCEDURE — 250N000013 HC RX MED GY IP 250 OP 250 PS 637: Performed by: INTERNAL MEDICINE

## 2022-01-01 PROCEDURE — 85610 PROTHROMBIN TIME: CPT | Performed by: EMERGENCY MEDICINE

## 2022-01-01 PROCEDURE — 120N000001 HC R&B MED SURG/OB

## 2022-01-01 PROCEDURE — 85610 PROTHROMBIN TIME: CPT | Performed by: INTERNAL MEDICINE

## 2022-01-01 PROCEDURE — 85610 PROTHROMBIN TIME: CPT | Mod: ORL | Performed by: NURSE PRACTITIONER

## 2022-01-01 PROCEDURE — 36415 COLL VENOUS BLD VENIPUNCTURE: CPT | Performed by: EMERGENCY MEDICINE

## 2022-01-01 PROCEDURE — 85027 COMPLETE CBC AUTOMATED: CPT | Mod: ORL

## 2022-01-01 PROCEDURE — 36415 COLL VENOUS BLD VENIPUNCTURE: CPT | Performed by: INTERNAL MEDICINE

## 2022-01-01 PROCEDURE — 93306 TTE W/DOPPLER COMPLETE: CPT | Mod: 26 | Performed by: INTERNAL MEDICINE

## 2022-01-01 PROCEDURE — 71045 X-RAY EXAM CHEST 1 VIEW: CPT

## 2022-01-01 PROCEDURE — 80048 BASIC METABOLIC PNL TOTAL CA: CPT | Mod: ORL | Performed by: NURSE PRACTITIONER

## 2022-01-01 PROCEDURE — 99239 HOSP IP/OBS DSCHRG MGMT >30: CPT | Performed by: INTERNAL MEDICINE

## 2022-01-01 PROCEDURE — P9603 ONE-WAY ALLOW PRORATED MILES: HCPCS | Mod: ORL | Performed by: NURSE PRACTITIONER

## 2022-01-01 PROCEDURE — 85027 COMPLETE CBC AUTOMATED: CPT | Performed by: INTERNAL MEDICINE

## 2022-01-01 PROCEDURE — 96360 HYDRATION IV INFUSION INIT: CPT

## 2022-01-01 PROCEDURE — 84295 ASSAY OF SERUM SODIUM: CPT | Performed by: INTERNAL MEDICINE

## 2022-01-01 PROCEDURE — 80048 BASIC METABOLIC PNL TOTAL CA: CPT | Performed by: EMERGENCY MEDICINE

## 2022-01-01 PROCEDURE — 82248 BILIRUBIN DIRECT: CPT | Performed by: EMERGENCY MEDICINE

## 2022-01-01 PROCEDURE — 93005 ELECTROCARDIOGRAM TRACING: CPT | Performed by: EMERGENCY MEDICINE

## 2022-01-01 PROCEDURE — 85018 HEMOGLOBIN: CPT | Mod: ORL | Performed by: NURSE PRACTITIONER

## 2022-01-01 PROCEDURE — 85025 COMPLETE CBC W/AUTO DIFF WBC: CPT | Performed by: EMERGENCY MEDICINE

## 2022-01-01 PROCEDURE — 258N000003 HC RX IP 258 OP 636: Performed by: EMERGENCY MEDICINE

## 2022-01-01 PROCEDURE — 85004 AUTOMATED DIFF WBC COUNT: CPT | Performed by: INTERNAL MEDICINE

## 2022-01-01 PROCEDURE — 250N000011 HC RX IP 250 OP 636: Performed by: INTERNAL MEDICINE

## 2022-01-01 PROCEDURE — 81001 URINALYSIS AUTO W/SCOPE: CPT | Performed by: EMERGENCY MEDICINE

## 2022-01-01 PROCEDURE — 99233 SBSQ HOSP IP/OBS HIGH 50: CPT | Performed by: INTERNAL MEDICINE

## 2022-01-01 PROCEDURE — 99223 1ST HOSP IP/OBS HIGH 75: CPT | Performed by: INTERNAL MEDICINE

## 2022-01-01 PROCEDURE — 255N000002 HC RX 255 OP 636: Performed by: INTERNAL MEDICINE

## 2022-01-01 PROCEDURE — 80053 COMPREHEN METABOLIC PANEL: CPT | Performed by: INTERNAL MEDICINE

## 2022-01-01 PROCEDURE — P9604 ONE-WAY ALLOW PRORATED TRIP: HCPCS | Mod: ORL

## 2022-01-01 PROCEDURE — 999N000208 ECHOCARDIOGRAM COMPLETE

## 2022-01-01 PROCEDURE — 85610 PROTHROMBIN TIME: CPT | Mod: ORL

## 2022-01-01 PROCEDURE — 36415 COLL VENOUS BLD VENIPUNCTURE: CPT | Mod: ORL

## 2022-01-01 PROCEDURE — 70450 CT HEAD/BRAIN W/O DYE: CPT

## 2022-01-01 PROCEDURE — 84484 ASSAY OF TROPONIN QUANT: CPT | Performed by: EMERGENCY MEDICINE

## 2022-01-01 PROCEDURE — 80048 BASIC METABOLIC PNL TOTAL CA: CPT | Mod: ORL

## 2022-01-01 PROCEDURE — 85027 COMPLETE CBC AUTOMATED: CPT | Mod: ORL | Performed by: NURSE PRACTITIONER

## 2022-01-01 PROCEDURE — 85014 HEMATOCRIT: CPT | Performed by: INTERNAL MEDICINE

## 2022-01-01 PROCEDURE — 82040 ASSAY OF SERUM ALBUMIN: CPT | Performed by: INTERNAL MEDICINE

## 2022-01-01 PROCEDURE — 99285 EMERGENCY DEPT VISIT HI MDM: CPT | Mod: 25

## 2022-01-01 PROCEDURE — 83735 ASSAY OF MAGNESIUM: CPT | Performed by: EMERGENCY MEDICINE

## 2022-01-01 PROCEDURE — 96361 HYDRATE IV INFUSION ADD-ON: CPT

## 2022-01-01 PROCEDURE — 87804 INFLUENZA ASSAY W/OPTIC: CPT | Performed by: EMERGENCY MEDICINE

## 2022-01-01 RX ORDER — ACETAMINOPHEN 500 MG
1000 TABLET ORAL DAILY PRN
COMMUNITY

## 2022-01-01 RX ORDER — POTASSIUM CHLORIDE 750 MG/1
20 TABLET, EXTENDED RELEASE ORAL DAILY
Status: ON HOLD | COMMUNITY
End: 2022-01-01

## 2022-01-01 RX ORDER — ONDANSETRON 2 MG/ML
4 INJECTION INTRAMUSCULAR; INTRAVENOUS EVERY 6 HOURS PRN
Status: DISCONTINUED | OUTPATIENT
Start: 2022-01-01 | End: 2022-01-01 | Stop reason: HOSPADM

## 2022-01-01 RX ORDER — ACETAMINOPHEN 325 MG/1
650 TABLET ORAL EVERY 6 HOURS PRN
Status: DISCONTINUED | OUTPATIENT
Start: 2022-01-01 | End: 2022-01-01 | Stop reason: HOSPADM

## 2022-01-01 RX ORDER — LISINOPRIL 10 MG/1
20 TABLET ORAL DAILY
Status: ON HOLD | COMMUNITY
End: 2022-01-01

## 2022-01-01 RX ORDER — AMOXICILLIN 250 MG
1 CAPSULE ORAL 2 TIMES DAILY PRN
Status: DISCONTINUED | OUTPATIENT
Start: 2022-01-01 | End: 2022-01-01 | Stop reason: HOSPADM

## 2022-01-01 RX ORDER — METOPROLOL TARTRATE 25 MG/1
12.5 TABLET, FILM COATED ORAL 2 TIMES DAILY
COMMUNITY

## 2022-01-01 RX ORDER — ONDANSETRON 4 MG/1
4 TABLET, ORALLY DISINTEGRATING ORAL EVERY 6 HOURS PRN
Status: DISCONTINUED | OUTPATIENT
Start: 2022-01-01 | End: 2022-01-01 | Stop reason: HOSPADM

## 2022-01-01 RX ORDER — ACETAMINOPHEN 500 MG
1000 TABLET ORAL 3 TIMES DAILY
Status: ON HOLD | COMMUNITY
End: 2022-01-01

## 2022-01-01 RX ORDER — TAMSULOSIN HYDROCHLORIDE 0.4 MG/1
0.4 CAPSULE ORAL DAILY
Status: DISCONTINUED | OUTPATIENT
Start: 2022-01-01 | End: 2022-01-01 | Stop reason: HOSPADM

## 2022-01-01 RX ORDER — FUROSEMIDE 40 MG
40 TABLET ORAL DAILY
Status: ON HOLD | COMMUNITY
End: 2022-01-01

## 2022-01-01 RX ORDER — SODIUM CHLORIDE, SODIUM LACTATE, POTASSIUM CHLORIDE, CALCIUM CHLORIDE 600; 310; 30; 20 MG/100ML; MG/100ML; MG/100ML; MG/100ML
INJECTION, SOLUTION INTRAVENOUS ONCE
Status: COMPLETED | OUTPATIENT
Start: 2022-01-01 | End: 2022-01-01

## 2022-01-01 RX ORDER — ACETAMINOPHEN 650 MG/1
650 SUPPOSITORY RECTAL EVERY 6 HOURS PRN
Status: DISCONTINUED | OUTPATIENT
Start: 2022-01-01 | End: 2022-01-01 | Stop reason: HOSPADM

## 2022-01-01 RX ORDER — HEPARIN SODIUM 5000 [USP'U]/.5ML
5000 INJECTION, SOLUTION INTRAVENOUS; SUBCUTANEOUS EVERY 12 HOURS
Status: DISCONTINUED | OUTPATIENT
Start: 2022-01-01 | End: 2022-01-01

## 2022-01-01 RX ORDER — TAMSULOSIN HYDROCHLORIDE 0.4 MG/1
0.4 CAPSULE ORAL DAILY
COMMUNITY

## 2022-01-01 RX ORDER — LIDOCAINE 40 MG/G
CREAM TOPICAL
Status: DISCONTINUED | OUTPATIENT
Start: 2022-01-01 | End: 2022-01-01 | Stop reason: HOSPADM

## 2022-01-01 RX ORDER — MULTIPLE VITAMINS W/ MINERALS TAB 9MG-400MCG
1 TAB ORAL DAILY
COMMUNITY

## 2022-01-01 RX ORDER — NITROGLYCERIN 0.4 MG/1
0.4 TABLET SUBLINGUAL EVERY 5 MIN PRN
COMMUNITY

## 2022-01-01 RX ORDER — DEXTROSE MONOHYDRATE 50 MG/ML
INJECTION, SOLUTION INTRAVENOUS CONTINUOUS
Status: DISCONTINUED | OUTPATIENT
Start: 2022-01-01 | End: 2022-01-01

## 2022-01-01 RX ORDER — WARFARIN SODIUM 5 MG/1
5 TABLET ORAL
Status: COMPLETED | OUTPATIENT
Start: 2022-01-01 | End: 2022-01-01

## 2022-01-01 RX ORDER — WARFARIN SODIUM 3 MG/1
3 TABLET ORAL SEE ADMIN INSTRUCTIONS
COMMUNITY

## 2022-01-01 RX ORDER — AMOXICILLIN 250 MG
2 CAPSULE ORAL 2 TIMES DAILY PRN
Status: DISCONTINUED | OUTPATIENT
Start: 2022-01-01 | End: 2022-01-01 | Stop reason: HOSPADM

## 2022-01-01 RX ADMIN — METOPROLOL TARTRATE 12.5 MG: 25 TABLET, FILM COATED ORAL at 08:45

## 2022-01-01 RX ADMIN — METOPROLOL TARTRATE 12.5 MG: 25 TABLET, FILM COATED ORAL at 08:10

## 2022-01-01 RX ADMIN — METOPROLOL TARTRATE 12.5 MG: 25 TABLET, FILM COATED ORAL at 21:02

## 2022-01-01 RX ADMIN — METOPROLOL TARTRATE 12.5 MG: 25 TABLET, FILM COATED ORAL at 10:05

## 2022-01-01 RX ADMIN — ANORECTAL OINTMENT: 15.7; .44; 24; 20.6 OINTMENT TOPICAL at 10:06

## 2022-01-01 RX ADMIN — TAMSULOSIN HYDROCHLORIDE 0.4 MG: 0.4 CAPSULE ORAL at 09:37

## 2022-01-01 RX ADMIN — ANORECTAL OINTMENT: 15.7; .44; 24; 20.6 OINTMENT TOPICAL at 08:38

## 2022-01-01 RX ADMIN — METOPROLOL TARTRATE 12.5 MG: 25 TABLET, FILM COATED ORAL at 08:37

## 2022-01-01 RX ADMIN — ANORECTAL OINTMENT: 15.7; .44; 24; 20.6 OINTMENT TOPICAL at 08:13

## 2022-01-01 RX ADMIN — ANORECTAL OINTMENT: 15.7; .44; 24; 20.6 OINTMENT TOPICAL at 08:44

## 2022-01-01 RX ADMIN — METOPROLOL TARTRATE 12.5 MG: 25 TABLET, FILM COATED ORAL at 19:14

## 2022-01-01 RX ADMIN — WARFARIN SODIUM 5 MG: 5 TABLET ORAL at 18:56

## 2022-01-01 RX ADMIN — HEPARIN SODIUM 5000 UNITS: 5000 INJECTION, SOLUTION INTRAVENOUS; SUBCUTANEOUS at 21:01

## 2022-01-01 RX ADMIN — ANORECTAL OINTMENT: 15.7; .44; 24; 20.6 OINTMENT TOPICAL at 14:50

## 2022-01-01 RX ADMIN — DEXTROSE MONOHYDRATE: 50 INJECTION, SOLUTION INTRAVENOUS at 08:44

## 2022-01-01 RX ADMIN — TAMSULOSIN HYDROCHLORIDE 0.4 MG: 0.4 CAPSULE ORAL at 08:45

## 2022-01-01 RX ADMIN — TAMSULOSIN HYDROCHLORIDE 0.4 MG: 0.4 CAPSULE ORAL at 08:38

## 2022-01-01 RX ADMIN — DEXTROSE MONOHYDRATE: 50 INJECTION, SOLUTION INTRAVENOUS at 20:35

## 2022-01-01 RX ADMIN — ANORECTAL OINTMENT: 15.7; .44; 24; 20.6 OINTMENT TOPICAL at 19:35

## 2022-01-01 RX ADMIN — ANORECTAL OINTMENT: 15.7; .44; 24; 20.6 OINTMENT TOPICAL at 20:28

## 2022-01-01 RX ADMIN — SODIUM CHLORIDE 500 ML: 9 INJECTION, SOLUTION INTRAVENOUS at 15:08

## 2022-01-01 RX ADMIN — METOPROLOL TARTRATE 12.5 MG: 25 TABLET, FILM COATED ORAL at 19:35

## 2022-01-01 RX ADMIN — ANORECTAL OINTMENT: 15.7; .44; 24; 20.6 OINTMENT TOPICAL at 20:00

## 2022-01-01 RX ADMIN — METOPROLOL TARTRATE 12.5 MG: 25 TABLET, FILM COATED ORAL at 09:36

## 2022-01-01 RX ADMIN — ACETAMINOPHEN 650 MG: 325 TABLET, FILM COATED ORAL at 09:37

## 2022-01-01 RX ADMIN — SODIUM CHLORIDE, POTASSIUM CHLORIDE, SODIUM LACTATE AND CALCIUM CHLORIDE: 600; 310; 30; 20 INJECTION, SOLUTION INTRAVENOUS at 17:22

## 2022-01-01 RX ADMIN — TAMSULOSIN HYDROCHLORIDE 0.4 MG: 0.4 CAPSULE ORAL at 10:05

## 2022-01-01 RX ADMIN — ANORECTAL OINTMENT: 15.7; .44; 24; 20.6 OINTMENT TOPICAL at 13:46

## 2022-01-01 RX ADMIN — ANORECTAL OINTMENT: 15.7; .44; 24; 20.6 OINTMENT TOPICAL at 09:37

## 2022-01-01 RX ADMIN — PERFLUTREN 2 ML: 6.52 INJECTION, SUSPENSION INTRAVENOUS at 10:50

## 2022-01-01 RX ADMIN — ANORECTAL OINTMENT: 15.7; .44; 24; 20.6 OINTMENT TOPICAL at 13:16

## 2022-01-01 RX ADMIN — TAMSULOSIN HYDROCHLORIDE 0.4 MG: 0.4 CAPSULE ORAL at 08:14

## 2022-01-01 RX ADMIN — METOPROLOL TARTRATE 12.5 MG: 25 TABLET, FILM COATED ORAL at 19:34

## 2022-01-01 ASSESSMENT — ACTIVITIES OF DAILY LIVING (ADL)
DIFFICULTY_EATING/SWALLOWING: NO
ADLS_ACUITY_SCORE: 35
ADLS_ACUITY_SCORE: 52
ADLS_ACUITY_SCORE: 56
WALKING_OR_CLIMBING_STAIRS: OTHER (SEE COMMENTS)
ADLS_ACUITY_SCORE: 56
ADLS_ACUITY_SCORE: 48
ADLS_ACUITY_SCORE: 50
DRESSING/BATHING_DIFFICULTY: YES
EQUIPMENT_CURRENTLY_USED_AT_HOME: WHEELCHAIR, MANUAL
ADLS_ACUITY_SCORE: 56
ADLS_ACUITY_SCORE: 56
SWALLOWING: 0-->SWALLOWS FOODS/LIQUIDS WITHOUT DIFFICULTY
ADLS_ACUITY_SCORE: 52
CHANGE_IN_FUNCTIONAL_STATUS_SINCE_ONSET_OF_CURRENT_ILLNESS/INJURY: YES
TOILETING_ISSUES: YES
TOILETING_ISSUES: YES
ADLS_ACUITY_SCORE: 56
ADLS_ACUITY_SCORE: 50
ADLS_ACUITY_SCORE: 48
ADLS_ACUITY_SCORE: 39
ADLS_ACUITY_SCORE: 39
WALKING_OR_CLIMBING_STAIRS_DIFFICULTY: YES
ADLS_ACUITY_SCORE: 48
ADLS_ACUITY_SCORE: 52
ADLS_ACUITY_SCORE: 50
ADLS_ACUITY_SCORE: 52
TOILETING_MANAGEMENT: DEPENDENT
ADLS_ACUITY_SCORE: 56
TOILETING_ASSISTANCE: TOILETING DIFFICULTY, DEPENDENT
DRESS: 0-->ASSISTANCE NEEDED (DEVELOPMENTALLY APPROPRIATE)
ADLS_ACUITY_SCORE: 52
TOILETING_MANAGEMENT: DEPENDENT
ADLS_ACUITY_SCORE: 48
TOILETING: 1-->ASSISTANCE (EQUIPMENT/PERSON) NEEDED (NOT DEVELOPMENTALLY APPROPRIATE)
ADLS_ACUITY_SCORE: 50
ADLS_ACUITY_SCORE: 39
ADLS_ACUITY_SCORE: 52
SWALLOWING: 0-->SWALLOWS FOODS/LIQUIDS WITHOUT DIFFICULTY (DEVELOPMENTALLY APPROPRIATE)
ADLS_ACUITY_SCORE: 56
WALKING_OR_CLIMBING_STAIRS_DIFFICULTY: YES
ADLS_ACUITY_SCORE: 48
ADLS_ACUITY_SCORE: 50
TRANSFERRING: 1-->ASSISTANCE (EQUIPMENT/PERSON) NEEDED
DRESSING/BATHING: BATHING DIFFICULTY, REQUIRES EQUIPMENT
EATING/SWALLOWING: OTHER (SEE COMMENTS)
TOILETING_ASSISTANCE: TOILETING DIFFICULTY, DEPENDENT
ADLS_ACUITY_SCORE: 39
EATING: 0-->INDEPENDENT
ADLS_ACUITY_SCORE: 56
FALL_HISTORY_WITHIN_LAST_SIX_MONTHS: NO
DOING_ERRANDS_INDEPENDENTLY_DIFFICULTY: NO
TOILETING: 1-->ASSISTANCE (EQUIPMENT/PERSON) NEEDED
ADLS_ACUITY_SCORE: 50
ADLS_ACUITY_SCORE: 56
ADLS_ACUITY_SCORE: 40
ADLS_ACUITY_SCORE: 50
ADLS_ACUITY_SCORE: 48
DRESSING/BATHING: BATHING DIFFICULTY, REQUIRES EQUIPMENT
ADLS_ACUITY_SCORE: 48
ADLS_ACUITY_SCORE: 50
ADLS_ACUITY_SCORE: 56
ADLS_ACUITY_SCORE: 48
BATHING: 1-->ASSISTANCE NEEDED
ADLS_ACUITY_SCORE: 56
DEPENDENT_IADLS:: CLEANING;COOKING;LAUNDRY;SHOPPING;MEAL PREPARATION;MEDICATION MANAGEMENT;MONEY MANAGEMENT;TRANSPORTATION;INCONTINENCE
ADLS_ACUITY_SCORE: 48
CONCENTRATING,_REMEMBERING_OR_MAKING_DECISIONS_DIFFICULTY: YES
ADLS_ACUITY_SCORE: 52
ADLS_ACUITY_SCORE: 48
ADLS_ACUITY_SCORE: 52
WEAR_GLASSES_OR_BLIND: NO
ADLS_ACUITY_SCORE: 48
EATING: 0-->INDEPENDENT
TRANSFERRING: 0-->ASSISTANCE NEEDED (DEVELOPMENTALLY APPROPRIATE)
ADLS_ACUITY_SCORE: 48
WEAR_GLASSES_OR_BLIND: NO
ADLS_ACUITY_SCORE: 56
ADLS_ACUITY_SCORE: 35
DIFFICULTY_EATING/SWALLOWING: NO
ADLS_ACUITY_SCORE: 52
ADLS_ACUITY_SCORE: 52
ADLS_ACUITY_SCORE: 35
ADLS_ACUITY_SCORE: 48
ADLS_ACUITY_SCORE: 50
WALKING_OR_CLIMBING_STAIRS: OTHER (SEE COMMENTS)
ADLS_ACUITY_SCORE: 48
ADLS_ACUITY_SCORE: 48
DRESSING/BATHING_MANAGEMENT: DEPENDENT
ADLS_ACUITY_SCORE: 48
ADLS_ACUITY_SCORE: 50
ADLS_ACUITY_SCORE: 52
ADLS_ACUITY_SCORE: 37
ADLS_ACUITY_SCORE: 35
DRESSING/BATHING_DIFFICULTY: YES
DRESS: 1-->ASSISTANCE (EQUIPMENT/PERSON) NEEDED

## 2022-11-23 PROBLEM — E87.0 HYPERNATREMIA: Status: ACTIVE | Noted: 2022-01-01

## 2022-11-23 NOTE — ED PROVIDER NOTES
"  Emergency Department Encounter     Evaluation Date & Time:   11/23/2022  1:56 PM    CHIEF COMPLAINT:  low oxygen level      Triage Note:EMS from assisted living: [facility]    Per staff pt had low O2 sats in mid 80's  Pt just ended COVID protocols, lost 20 lbs - failure to thrive  Lung sounds clear per EMS.     BS:164           ED COURSE & MEDICAL DECISION MAKING:     ED Course as of 11/23/22 1833   Wed Nov 23, 2022   1605 Sodium 154, appears hypovolemic with Bun/Cr ratio elevated, acute on chronic renal failure. Given age, comorbidities, presentation, will hospitalize.  IVF switched  to LR for lower sodium content.     1642 CT head neg for acute pathology.  CXR neg for acute pathology.   1718 Repeat trop down from previous.     Pt sent in from his assisted living facility for evaluation after staff noticed he was less responsive today and had a low BP and hypoxia.  Pt arrives here now and on my evaluation is awake, conversational with 100% RA O2 sats.  Initial BP a little soft, but improved on repeat with MAP in the 70s. Pt has a history of dementia, is a very limited historian.  Daughters here report he's had similar \"unresponsive\" episodes over the past year with no clear cause.  Pt still confused for recent events, does not recognize daughters.  He does have a prior remote stroke history with chronic left hemiplegia and is non-ambulatory at baseline.  Will get labs, imaging, reassess.  Anticipate potential discharge home if workup overall reassuring and pt remains vitally well, awake and near baseline.  Some confusion could certainly be contributed to waxing/waning dementia.    2:41 PM Met with patient for initial interview and exam. Discussed initial plan for care for their stay in the emergency department.  PPE: Provider wore gloves, N95 mask  4:50 PM Rechecked patient. Updated patient and daughters on results. Discussed plans for admission.   5:27 PM Spoke with Dr. Liz, hospitalist. " "They have accepted the patient for admission.     At the conclusion of the encounter I discussed the results of all the tests and the disposition. The questions were answered. The patient or family acknowledged understanding and was agreeable with the care plan.    MEDICATIONS GIVEN IN THE EMERGENCY DEPARTMENT:  Medications   0.9% sodium chloride BOLUS (500 mLs Intravenous New Bag 11/23/22 1508)   lactated ringers infusion ( Intravenous New Bag 11/23/22 1722)       NEW PRESCRIPTIONS STARTED AT TODAY'S ED VISIT:  New Prescriptions    No medications on file       HPI   The history is provided by a relative and the EMS personnel. History limited by: dementia.   HPI limited secondary to dementia    Connor Rea is a 87 year old male with a pertinent history of dementia, CVA with left sided residual effects, HTN and HLD who presents to this ED via EMS for evaluation of low oxygen saturations.     Per EMS report patient presents from [facility] where he is in skilled nursing. Patient having tested positive for COVID-19 10 days ago with today being his first day outside of quarantine. Staff report the patient with difficulties swallowing and decreased responsiveness. They note that upon checking the patient his blood pressure having been low as well as having low oxygen saturations. Patient was placed on oxygen and upon recheck was found to have lower oxygen saturations prompting to call EMS.     Per patient's daughters they note the patient having had relatively mild symptoms with his COVID-19 infection. They state the patient has not had any falls within the past few days but within the past year has had episodes of decreased responsiveness. They note the patients speech appears different and is more raspy as well as vague. Patient at baseline normally recognizes his daughters but currently is unable to identify them stating they are \"strangers\" and that he does not know them. Patient's daughters note " the patient has had left sided residual effects from a stroke and is not able to ambulate at baseline. Patient denies any headache, chest pain, abdominal pain, shortness of breath as well as any other complaints at the time.     REVIEW OF SYSTEMS:  Review of Systems   Unable to perform ROS: Dementia   ROS limited secondary to dementia    Medical History   History reviewed. No pertinent past medical history.    History reviewed. No pertinent surgical history.    No family history on file.         acetaminophen (TYLENOL) 500 MG tablet  acetaminophen (TYLENOL) 500 MG tablet  furosemide (LASIX) 40 MG tablet  lisinopril (ZESTRIL) 10 MG tablet  menthol-zinc oxide (CALMOSEPTINE) 0.44-20.6 % OINT ointment  metoprolol tartrate (LOPRESSOR) 25 MG tablet  multivitamin w/minerals (THERA-VIT-M) tablet  nitroGLYcerin (NITROSTAT) 0.4 MG sublingual tablet  potassium chloride ER (KLOR-CON M) 10 MEQ CR tablet  tamsulosin (FLOMAX) 0.4 MG capsule  warfarin ANTICOAGULANT (COUMADIN) 3 MG tablet        Physical Exam     Vitals:  BP (!) 164/71   Pulse 66   Temp 98.3  F (36.8  C) (Oral)   Resp 17   SpO2 99%     PHYSICAL EXAM:   Physical Exam  Vitals and nursing note reviewed.   Constitutional:       General: He is not in acute distress.     Appearance: Normal appearance.      Comments: Elderly, frail appearing   HENT:      Head: Normocephalic and atraumatic.      Nose: Nose normal.      Mouth/Throat:      Mouth: Mucous membranes are moist.   Eyes:      Pupils: Pupils are equal, round, and reactive to light.   Cardiovascular:      Rate and Rhythm: Normal rate and regular rhythm.      Pulses: Normal pulses.           Radial pulses are 2+ on the right side and 2+ on the left side.        Dorsalis pedis pulses are 2+ on the right side and 2+ on the left side.   Pulmonary:      Effort: Pulmonary effort is normal. No respiratory distress.      Breath sounds: Normal breath sounds.   Abdominal:      Palpations: Abdomen is soft.      Tenderness:  There is no abdominal tenderness.   Musculoskeletal:      Cervical back: Full passive range of motion without pain and neck supple.      Comments: No calf tenderness or swelling b/l   Skin:     General: Skin is warm and dry.      Findings: No rash.      Comments: Dry skin   Neurological:      General: No focal deficit present.      Mental Status: He is alert. Mental status is at baseline.      Comments: Chronic left hemiplegia. Non ambulatory at baseline.    Psychiatric:         Mood and Affect: Mood normal.         Behavior: Behavior normal.      Comments: Disoriented to time and place baseline. Confused to recent events.            Results     LAB:  All pertinent labs reviewed and interpreted  Labs Ordered and Resulted from Time of ED Arrival to Time of ED Departure   BASIC METABOLIC PANEL - Abnormal       Result Value    Sodium 154 (*)     Potassium 4.7      Chloride 113 (*)     Carbon Dioxide (CO2) 28      Anion Gap 13      Urea Nitrogen 57.2 (*)     Creatinine 1.86 (*)     Calcium 9.8      Glucose 139 (*)     GFR Estimate 35 (*)    MAGNESIUM - Abnormal    Magnesium 2.7 (*)    TROPONIN T, HIGH SENSITIVITY - Abnormal    Troponin T, High Sensitivity 65 (*)    HEPATIC FUNCTION PANEL - Abnormal    Protein Total 7.7      Albumin 4.2      Bilirubin Total 0.5      Alkaline Phosphatase 135 (*)     AST 49       (*)     Bilirubin Direct <0.20     INR - Abnormal    INR 1.88 (*)    CBC WITH PLATELETS AND DIFFERENTIAL - Abnormal    WBC Count 11.6 (*)     RBC Count 4.87      Hemoglobin 15.6      Hematocrit 51.1       (*)     MCH 32.0      MCHC 30.5 (*)     RDW 13.2      Platelet Count 258      % Neutrophils 75      % Lymphocytes 16      % Monocytes 7      % Eosinophils 1      % Basophils 0      % Immature Granulocytes 1      NRBCs per 100 WBC 0      Absolute Neutrophils 8.7 (*)     Absolute Lymphocytes 1.9      Absolute Monocytes 0.8      Absolute Eosinophils 0.2      Absolute Basophils 0.1      Absolute  Immature Granulocytes 0.1      Absolute NRBCs 0.0     TROPONIN T, HIGH SENSITIVITY - Abnormal    Troponin T, High Sensitivity 59 (*)    INFLUENZA A/B ANTIGEN - Normal    Influenza A antigen Negative      Influenza B antigen Negative     ROUTINE UA WITH MICROSCOPIC REFLEX TO CULTURE       RADIOLOGY:  XR Chest Port 1 View   Final Result   IMPRESSION: The lungs are underinflated, but no acute cardiopulmonary abnormality. Normal cardiomediastinal silhouette with median sternotomy wires and surgical clips.      Head CT w/o contrast   Final Result   IMPRESSION:   1.  No acute intracranial abnormality.      2.  Multifocal encephalomalacia involving the left occipital lobe and the right insula extending to the right basal ganglia and caudate, with an additional small focus involving the parasagittal right frontal lobe and anterior body of the corpus    callosum. Accompanying background age-related changes include a moderate to severe diffuse parenchymal volume loss with a mild to moderate burden scattered chronic small vessel ischemic change.                   ECG:  NSR, LBBB, no acute ischemia, no prior for comparison    I have independently reviewed and interpreted the EKG(s) documented above     PROCEDURES:  Procedures:  None.       FINAL IMPRESSION:    ICD-10-CM    1. Hypernatremia  E87.0       2. Acute renal failure superimposed on chronic kidney disease, unspecified CKD stage, unspecified acute renal failure type (H)  N17.9     N18.9       3. Dehydration  E86.0           0 minutes of critical care time      I, Kelly Ohara, am serving as a scribe to document services personally performed by Dr. Giuseppe Elkins, based on my observations and the provider's statements to me. I, Giuseppe Elkins, DO attest that Kelly Ohara is acting in a scribe capacity, has observed my performance of the services and has documented them in accordance with my direction.      Giuseppe Elkins, DO  Emergency Medicine  [facility]  [facility] EMERGENCY DEPARTMENT  11/23/2022  2:52 PM        Giuseppe Elkins MD  11/23/22 2062

## 2022-11-23 NOTE — PHARMACY-ADMISSION MEDICATION HISTORY
Pharmacy Note - Admission Medication History    Pertinent Provider Information: Patient had an INR of 6.13 on 11/21, received a 1.25 mg dose of oral phytonadione, and warfarin has been held since that time. Previously was getting 1.5 mg Wednesdays and 3 mg ROW.     ______________________________________________________________________    Prior To Admission (PTA) med list completed and updated in EMR.       PTA Med List   Medication Sig Last Dose     acetaminophen (TYLENOL) 500 MG tablet Take 1,000 mg by mouth 3 times daily 11/23/2022 at x1 am     acetaminophen (TYLENOL) 500 MG tablet Take 1,000 mg by mouth daily as needed for mild pain      furosemide (LASIX) 40 MG tablet Take 40 mg by mouth daily 11/23/2022     lisinopril (ZESTRIL) 10 MG tablet Take 20 mg by mouth daily 11/22/2022     menthol-zinc oxide (CALMOSEPTINE) 0.44-20.6 % OINT ointment Apply topically 3 times daily Apply to buttocks/coccyx 11/23/2022 at x1 am     metoprolol tartrate (LOPRESSOR) 25 MG tablet Take 12.5 mg by mouth 2 times daily 11/23/2022 at x1 am     multivitamin w/minerals (THERA-VIT-M) tablet Take 1 tablet by mouth daily 11/23/2022     nitroGLYcerin (NITROSTAT) 0.4 MG sublingual tablet Place 0.4 mg under the tongue every 5 minutes as needed for chest pain For chest pain place 1 tablet under the tongue every 5 minutes for 3 doses. If symptoms persist 5 minutes after 1st dose call 911.      potassium chloride ER (KLOR-CON M) 10 MEQ CR tablet Take 20 mEq by mouth daily 11/23/2022     tamsulosin (FLOMAX) 0.4 MG capsule Take 0.4 mg by mouth daily 11/23/2022     warfarin ANTICOAGULANT (COUMADIN) 3 MG tablet Take 3 mg by mouth See Admin Instructions Take 1.5 mg Wednesday and 3 mg all other days. Past Week at 11/20       Information source(s): Facility (U/NH/) medication list/MAR  Method of interview communication: N/A    Summary of Changes to PTA Med List  New: All  Discontinued: None  Changed: None    Patient was asked about OTC/herbal  products specifically.  PTA med list reflects this.    In the past week, patient estimated taking medication this percent of the time:  greater than 90%.    Allergies were reviewed, assessed, and updated with the patient.      Patient did not bring any medications to the hospital and can't retrieve from home. No multi-dose medications are available for use during hospital stay.     The information provided in this note is only as accurate as the sources available at the time of the update(s).    Thank you for the opportunity to participate in the care of this patient.    Bella An [facility]  11/23/2022 5:13 PM

## 2022-11-23 NOTE — ED NOTES
Bed: JNED-05  Expected date:   Expected time:   Means of arrival:   Comments:  [facility] - 87 M Low O2

## 2022-11-24 NOTE — PLAN OF CARE
Problem: Plan of Care - These are the overarching goals to be used throughout the patient stay.    Goal: Absence of Hospital-Acquired Illness or Injury  Intervention: Identify and Manage Fall Risk  Recent Flowsheet Documentation  Taken 11/23/2022 2100 by Tari Sommer RN  Safety Promotion/Fall Prevention:    bed alarm on    clutter free environment maintained    fall prevention program maintained    patient and family education    room near nurse's station  Taken 11/23/2022 1830 by Tari Sommer RN  Safety Promotion/Fall Prevention:    bed alarm on    clutter free environment maintained    fall prevention program maintained    patient and family education    room near nurse's station     Problem: Risk for Delirium  Goal: Improved Behavioral Control  Intervention: Minimize Safety Risk  Recent Flowsheet Documentation  Taken 11/23/2022 2100 by Tari Sommer RN  Enhanced Safety Measures: bed alarm set  Taken 11/23/2022 1830 by Tari Sommer RN  Enhanced Safety Measures: bed alarm set   Goal Outcome Evaluation:    Patient is oriented to self only.  Appears to be hard of hearing.  He denied pain/discomfort since admission.  Left sided weakness d/t CVA. Left hand contracture. Able to move left left.  Incontinent of bowel and bladder.  Primofit in place and patent.  Two small open areas on coccyx/sacrum ( see flowsheet).  Continue to monitor  Temp: 98.8  F (37.1  C) Temp src: Oral BP: 121/64 Pulse: 89   Resp: 25 SpO2: 98 % O2 Device: None (Room air)

## 2022-11-24 NOTE — PLAN OF CARE
Goal Outcome Evaluation:        Occupational Therapy: Orders received. Chart reviewed and discussed with care team.? Occupational Therapy not indicated at this time per chart review pt is a resident at [facility] facility and is total assist for all cares, W/c bound at baseline and they use a lift for transfers. No skilled OT needs at this time.? Defer discharge recommendations to interdisciplinary team.? Will complete orders.     CHARLINE Liu, OTR/L, 11/24/2022, 10:45 AM

## 2022-11-24 NOTE — PLAN OF CARE
Problem: Plan of Care - These are the overarching goals to be used throughout the patient stay.    Goal: Optimal Comfort and Wellbeing  Outcome: Progressing   Goal Outcome Evaluation:    Pt took medications crushed with apple sauce.  Took bites of breakfast and lunch and sip when offered.  Pt is resisting to repositions and cares.  Cont IVF at 50 ml/hr.  Sodium check every 4 hours.  Plan is not to transfer pt to ICU for now.

## 2022-11-24 NOTE — PHARMACY-ANTICOAGULATION SERVICE
Clinical Pharmacy - Warfarin Dosing Consult     Pharmacy has been consulted to manage this patient s warfarin therapy.  Indication: Other - specify in comments (History of stroke)  Therapy Goal: INR 2-3  Provider/Team: Hospitalist  Warfarin Prior to Admission: Yes  Warfarin PTA Regimen: 1.5 mg on Wed, 3 mg all other days  Significant drug interactions: Acetaminophen (home med) and melatonin (new med) both can increase bleeding risk  Recent documented change in oral intake/nutrition: Unknown  Dose Comments: Per med rec note, last warfarin dose was on 11/20/22. INR of 11/21/22 was 6.13.    INR   Date Value Ref Range Status   11/23/2022 1.88 (H) 0.85 - 1.15 Final   11/23/2022 1.93 (H) 0.85 - 1.15 Final       Recommend warfarin 1.5 mg now for missed dose on 11/23/22.  Pharmacy will monitor Connor Rea daily and order warfarin doses to achieve specified goal.      Please contact pharmacy as soon as possible if the warfarin needs to be held for a procedure or if the warfarin goals change.

## 2022-11-24 NOTE — PROGRESS NOTES
[facility]    Medicine Progress Note - Hospitalist Service    Date of Admission:  11/23/2022    Assessment & Plan        Connor Rea is a 87 year old male admitted on 11/23/2022. He has been admitted from Nursing home. He has a h/o Dementia. He was found to be more confused on day of admit  and was also found to have hypotension as well as hypoxia. He did have recent COVID infection around 10 days back and has been having failure to thrive following the COVID infection.  Here he was diagnosed with hypernatremia as well as MAYANK    1.Hypernatremia, dehydration  -suspect took less intake with covid  -D5W now with checks for NA q 4 hours and slowly correct    2.MAYANK  -creat was 1.86, baseline closer to 1  -continue IVF and correct above  -avoid nephrotoxins  -holding lasix  -holding Ace-i    3.acute metabolic encephalopathy  -from hypernatremia, MAYANK and at risk with dementia  -ct neg acute  -hx of old cva's    4.Suspect vascular dementia  -chronic left side weak  -on warfarin  -impressive ct    5.Elevated LFT's  -suspect related to Covid  -trending down    6.Leukocytosis  -on admit 11.6, suspect related to reactive nature of dehydration  -today wbc 8.9    7.Chronic edema in legs  -holding lasix     8.HTN  -holding ace-I and lasix  -on metoprolol    9.BPH  -flomax    10.hx of cad  -s/p cabg    11.Recent Covid  --11 days ago, so recovered    12.Pos trop  -hx of cad  -now likely trop leak  -check echo    Addendum--0166-- I called daughter to let her know about echo, severe CHF  Talked about goals of care and why I think we need hospice consult now--she agreed  -also talked to family--NO ESCALATION TO ICU           Diet: Combination Diet Regular Diet Adult    DVT Prophylaxis: Warfarin  Haji Catheter: Not present  Central Lines: None  Cardiac Monitoring: None  Code Status: No CPR- Do NOT Intubate      Disposition Plan      Expected Discharge Date: 11/26/2022      Destination: long-term care facility           The patient's care was discussed with the Care Coordinator/ and Patient.    Che Dumont MD  Hospitalist Service  [facility]  Securely message with the Vocera Web Console (learn more here)  Text page via [employer] Paging/Directory           ______________________________________________________________________    Interval History   He was just waking up  He denied pain  Seems a bit confused     Data reviewed today: I reviewed all medications, new labs and imaging results over the last 24 hours. I personally reviewed  Labs and images    Physical Exam   Vital Signs: Temp: 97.6  F (36.4  C) Temp src: Axillary BP: 134/65 Pulse: 60   Resp: 20 SpO2: 95 % O2 Device: None (Room air)    Weight: 175 lbs 9.6 oz  Constitutional: awake, fatigued, somnolent, cooperative and no apparent distress  Respiratory: no increased work of breathing, good air exchange, no retractions and clear to auscultation  Cardiovascular: Normal apical impulse, regular rate and rhythm, normal S1 and S2, no S3 or S4, and no murmur noted  GI: normal bowel sounds, soft, non-distended and non-tender  Skin: no bruising or bleeding  Musculoskeletal: edema legs  Neurologic: Mental Status Exam:  Level of Alertness:   lethargic  Attention/Concentration:  abnormal - brief  Motor Exam:  Left side weak  Neuropsychiatric: General: poor eye contact  Affect: flat    Data   Recent Labs   Lab 11/24/22  0544 11/23/22  1624 11/23/22  1411 11/23/22  0654 11/21/22  0858   WBC 8.9  --  11.6*  --   --    HGB 12.4*  --  15.6  --   --    *  --  105*  --   --      --  258  --   --    INR  --   --  1.88* 1.93* 6.13*   *  150* 155* 154*  --   --    POTASSIUM 4.1  4.1  --  4.7  --   --    CHLORIDE 115*  115*  --  113*  --   --    CO2 26  26  --  28  --   --    BUN 48.9*  --  57.2*  --   --    CR 1.61*  --  1.86*  --   --    ANIONGAP 9  9  --  13  --   --    COLLIN 8.7*  --  9.8  --   --    *  --  139*  --    --    ALBUMIN 3.4*  --  4.2  --   --    PROTTOTAL 5.9*  --  7.7  --   --    BILITOTAL 0.3  --  0.5  --   --    ALKPHOS 102  --  135*  --   --    ALT 81*  --  112*  --   --    AST 34  --  49  --   --      Recent Results (from the past 24 hour(s))   Head CT w/o contrast    Narrative    EXAM: CT HEAD W/O CONTRAST  LOCATION: [facility]  DATE/TIME: 11/23/2022 3:24 PM    INDICATION: Transient confusion.  COMPARISON: None.  TECHNIQUE: Routine CT Head without IV contrast. Multiplanar reformats. Dose reduction techniques were used.    FINDINGS:  INTRACRANIAL CONTENTS: No intracranial hemorrhage, extraaxial collection, or mass effect. No CT evidence of acute infarct. Chronic encephalomalacia in the right insula extending to the right basal ganglia and right caudate. Additional chronic   encephalomalacia in the posterior and inferior left occipital lobe. Focus of chronic encephalomalacia involving the parasagittal right frontal lobe with extension to involve the anterior aspect of the corpus callosum. Mild to moderate burden scattered   chronic small vessel ischemic change. Background moderate to severe diffuse parenchymal volume loss. Ventricular size is in keeping with this background diffuse parenchymal volume loss, but accentuated by a component of ex vacuo prominence at the right   frontal horn and left occipital horns.    VISUALIZED ORBITS/SINUSES/MASTOIDS: No intraorbital abnormality. No paranasal sinus mucosal disease. No middle ear or mastoid effusion.    BONES/SOFT TISSUES: No acute abnormality.      Impression    IMPRESSION:  1.  No acute intracranial abnormality.    2.  Multifocal encephalomalacia involving the left occipital lobe and the right insula extending to the right basal ganglia and caudate, with an additional small focus involving the parasagittal right frontal lobe and anterior body of the corpus   callosum. Accompanying background age-related changes include a moderate to  severe diffuse parenchymal volume loss with a mild to moderate burden scattered chronic small vessel ischemic change.   XR Chest Port 1 View    Narrative    EXAM: XR CHEST PORT 1 VIEW  LOCATION: [facility]  DATE/TIME: 11/23/2022 3:28 PM    INDICATION: sob  COMPARISON: 03/06/2015      Impression    IMPRESSION: The lungs are underinflated, but no acute cardiopulmonary abnormality. Normal cardiomediastinal silhouette with median sternotomy wires and surgical clips.

## 2022-11-24 NOTE — PLAN OF CARE
Goal Outcome Evaluation:               VSS.  External cath with brandon urine. Refused coumadin administration with applesauce. Moving head back and forth and using arm to block spoon.  Refused H2O. Nonweightbearing. Turned q2hrs.  Meplix to coccyx.  Pt did not verbalize.  Care plan reviewed.   Problem: Risk for Delirium  Goal: Optimal Coping  Outcome: Not Progressing  Goal: Improved Behavioral Control  Outcome: Not Progressing  Goal: Improved Attention and Thought Clarity  Outcome: Not Progressing

## 2022-11-24 NOTE — CONSULTS
"Care Management Initial Consult    General Information  Assessment completed with: Cassie, Ana daughter via phone  Type of CM/SW Visit: Initial Assessment    Primary Care Provider verified and updated as needed: Yes   Readmission within the last 30 days: no previous admission in last 30 days      Reason for Consult: discharge planning  Advance Care Planning: Advance Care Planning Reviewed: no concerns identified          Communication Assessment  Patient's communication style: spoken language (English or Bilingual)             Cognitive  Cognitive/Neuro/Behavioral:   Per family, \"dementia at baseline\"                     Living Environment:   People in home: facility resident     Current living Arrangements: extended care facility  Name of Facility: [facility]   Able to return to prior arrangements: yes       Family/Social Support:  Care provided by: other (see comments) (LT staff\")  Provides care for: no one, unable/limited ability to care for self     Facility resident(s)/Staff, Children          Description of Support System: Supportive, Involved    Support Assessment: Adequate family and caregiver support, Adequate social supports, Patient communicates needs well met    Current Resources:   Patient receiving home care services: No     Community Resources: Skilled Nursing Facility ([facility])  Equipment currently used at home: wheelchair, manual, lift device (\"he does not stand at all anymore.\")  Supplies currently used at home: Incontinence Supplies, Other (\"glasses\")    Employment/Financial:  Employment Status: retired     Employment/ Comments: \"He would probably have benefits, but doesn't use any of them\".  Financial Concerns:     Referral to Financial Worker: No       Lifestyle & Psychosocial Needs:  Social Determinants of Health     Tobacco Use: Not on file   Alcohol Use: Not on file   Financial Resource Strain: Not on file   Food Insecurity: Not on file   Transportation " "Needs: Not on file   Physical Activity: Not on file   Stress: Not on file   Social Connections: Not on file   Intimate Partner Violence: Not on file   Depression: Not on file   Housing Stability: Not on file       Functional Status:  Prior to admission patient needed assistance:   Dependent ADLs:: Wheelchair-with assist, Bathing, Dressing, Grooming, Incontinence, Positioning, Transfers, Toileting (\"he is able to eat on his own\")  Dependent IADLs:: Cleaning, Cooking, Laundry, Shopping, Meal Preparation, Medication Management, Money Management, Transportation, Incontinence  Assesssment of Functional Status: Not at baseline with ADL Functioning, Not at baseline with mobility, Not at  functional baseline    Mental Health Status:          Chemical Dependency Status:                Values/Beliefs:  Spiritual, Cultural Beliefs, Christian Practices, Values that affect care:                 Additional Information:  Connor lives at [facility]. He is total cares and wheelchair bound at baseline. Per daughter, \"he cannot stand so they use the lift for him\".    He was diagnosed with COVID 11/12/22 and today was \"the first day off quarantine at the facility\".    He should be able to return to LTC at discharge. Unknown other discharge needs.    [facility] transport.    Ana daughter [phone].    Abigail Walker RN      "

## 2022-11-24 NOTE — ED NOTES
[facility] ED Handoff Report    ED Chief Complaint: low pulse oximetry    ED Diagnosis:  (E87.0) Hypernatremia    (N17.9,  N18.9) Acute renal failure superimposed on chronic kidney disease, unspecified CKD stage, unspecified acute renal failure type (H)    (E86.0) Dehydration       PMH:  History reviewed. No pertinent past medical history.     Code Status:  No Order . Pt has a POLST.    Falls Risk: Yes Band: Applied    Current Living Situation/Residence: lives in a skilled nursing facility     Elimination Status: Continent: No, wears briefs and purewick in place.  Urine specimen sent.     Activity Level: Total assist/lift    Patients Preferred Language:  English     Needed: No    Vital Signs:  BP (!) 164/71   Pulse 66   Temp 98.3  F (36.8  C) (Oral)   Resp 17   SpO2 99%      Cardiac Rhythm: NSR    Pain Score: 0/10    Is the Patient Confused:  Yes    Last Food or Drink: 11/23/22 at PTA    Focused Assessment:  Pt has not displayed any s/s low oxygen levels via pulse oximetry.    Tests Performed: Done: Labs and Imaging    Treatments Provided:  LR 1L, NS 500mLs    Family Dynamics/Concerns: No    Family Updated On Visitor Policy: Yes    Plan of Care Communicated to Family: Yes    Who Was Updated about Plan of Care: 2 daughters    Belongings Checklist Done and Signed by Patient: Yes    Medications sent with patient: none to be sent    Covid: asymptomatic , negative.  Pt previously had COVID.

## 2022-11-24 NOTE — PROGRESS NOTES
"Care Management Follow Up    Length of Stay (days): 1    Expected Discharge Date: 11/26/2022     Concerns to be Addressed: sodium level        Patient plan of care discussed at interdisciplinary rounds: Yes    Anticipated Discharge Disposition:  LTC     Anticipated Discharge Services:  hospice    Anticipated Discharge DME:  No new DME      Additional Information:  Patient presenting with recent COVID infection around 10 days back and has been having failure to thrive following the COVID infection.  Here he was diagnosed with hypernatremia as well as MAYANK. Acute metabolic encephalopathy.    Family interested in hospice care.      Social history:  Connor lives at [facility]. He is total cares and wheelchair bound at baseline. Per daughter, \"he cannot stand so they use the lift for him\". He was diagnosed with COVID 11/12/22 and today was \"the first day off quarantine at the facility\". He should be able to return to LTC at discharge.  [facility] transport. Ana daughter is primary family contact.    Planning for return to LTC.     1:13 PM  Spoke with Ana regarding hospice care. She states interest and states no preferences.  Referral faxed and called to [facility].     1:41 PM Spoke with Mary at [facility]. They will reach out to Ana to arrange consult.           Bhakti Crespo RN        "

## 2022-11-24 NOTE — H&P
[facility]    History and Physical - Hospitalist Service       Date of Admission:  11/23/2022    Assessment & Plan             Connor Rea is a 87 year old male admitted on 11/23/2022. He has been admitted from Nursing home. He has a h/o Dementia and is not oriented to time, place and person at baseline. But he was found to be more confused today and was also found to have low BP and low oxygen saturation at nursing home. He did have recent COVID infection around 10 days back and has been having failure to thrive following the COVID infection. Patient subsequently came to the ER for further evaluation and management. His BP and oxygen saturation seem stable and normal. But he was found to be significantly dehydrated with hypernatremia ( sodium at 154 ) and MAYANK. He was given iv fluid boluses by ER team and started on D5W. Iv fluids changed to D5W and will monitor sodium.  Will hold lasix. Discussed with patient's daughter and she confirmed DNR/DNI status.        A/p :           Acute metabolic  Encephalopathy associated with h/o Dementia : 2/2 high sodium ?, dehydration, on iv hydration. CT head : no acute abnormality.      Dementia, h/o : monitor neuro status      Hypernatremia : sodium at 154, s/p iv fluid boluses by ER team, on D5W currently, monitor sodium.      MAYANK : 2/2 dehydration, on iv hydration, monitor, hold lasix, lisinopril      Abnormal LFT's : monitor for now.      Leucocytosis : no evidence of infection at this time, ? Reactive, monitor.      Chronic b/l swelling in legs : On lasix at home, hold for now.      HTN, Essential, uncontrolled : on lisinopril 20 mg daily, metoprolol 12.5 mg bid - at home, iv hydralazine prn.      BPH : on flomax      Stroke  H/o - left side weakness, on coumadin ( could not find old records at this time to evaluate the need for coumadin but he was supra therapeutic recently )      CAD s/p CABG, remote history : on bb.      Recent COVID infection  around 10 days ago : stable neuro status             Diet:   regular diet following dysphagia screen  DVT Prophylaxis: Heparin SQ  Haji Catheter: Not present  Central Lines: None  Cardiac Monitoring: None  Code Status:   DNR/DNI    Clinically Significant Risk Factors Present on Admission         # Hypernatremia: Highest Na = 154 mmol/L (Ref range: 136-145) in last 2 days, will monitor as appropriate       # Drug Induced Coagulation Defect: home medication list includes an anticoagulant medication   # Acute Kidney Injury, unspecified: based on a >150% or 0.3 mg/dL increase in last creatinine compared to past 90 day average, will monitor renal function  # Hypertension: home medication list includes antihypertensive(s)             Disposition Plan      Expected Discharge Date: 11/26/2022      Destination: long-term care facility          The patient's care was discussed with the Bedside Nurse and Patient.    Devendra Liz MD  Hospitalist Service  [facility]  Securely message with the Vocera Web Console (learn more here)  Text page via [employer] Paging/Directory         ______________________________________________________________________    Chief Complaint   confusion    History is obtained from the patient    History of Present Illness     Connor Rea is a 87 year old male admitted on 11/23/2022. He has been admitted from Nursing home. He has a h/o Dementia and is not oriented to time, place and person at baseline. But he was found to be more confused today and was also found to have low BP and low oxygen saturation at nursing home. He did have recent COVID infection around 10 days back and has been having failure to thrive following the COVID infection. Patient subsequently came to the ER for further evaluation and management. His BP and oxygen saturation seem stable and normal. But he was found to be significantly dehydrated with hypernatremia ( sodium at 154 ) and MAYANK. He was given iv  fluid boluses by ER team and started on D5W. Iv fluids changed to D5W and will monitor sodium.  Will hold lasix. Discussed with patient's daughter and she confirmed DNR/DNI status.          Review of Systems      No fever or chills  No cp, sob, cough or phlegm  No abdominal symptoms  No urinary symptoms    Past Medical History    I have reviewed this patient's medical history and updated it with pertinent information if needed.   History reviewed. No pertinent past medical history.    Past Surgical History   I have reviewed this patient's surgical history and updated it with pertinent information if needed.  History reviewed. No pertinent surgical history.    Social History   I have reviewed this patient's social history and updated it with pertinent information if needed.        Lives in NH    4 children  Denies smoking, alcohol, drugs  Uses a wheelchair    Family History     Non contributory    Prior to Admission Medications   Prior to Admission Medications   Prescriptions Last Dose Informant Patient Reported? Taking?   acetaminophen (TYLENOL) 500 MG tablet 11/23/2022 at x1 am  Yes Yes   Sig: Take 1,000 mg by mouth 3 times daily   acetaminophen (TYLENOL) 500 MG tablet   Yes Yes   Sig: Take 1,000 mg by mouth daily as needed for mild pain   furosemide (LASIX) 40 MG tablet 11/23/2022  Yes Yes   Sig: Take 40 mg by mouth daily   lisinopril (ZESTRIL) 10 MG tablet 11/22/2022  Yes Yes   Sig: Take 20 mg by mouth daily   menthol-zinc oxide (CALMOSEPTINE) 0.44-20.6 % OINT ointment 11/23/2022 at x1 am  Yes Yes   Sig: Apply topically 3 times daily Apply to buttocks/coccyx   metoprolol tartrate (LOPRESSOR) 25 MG tablet 11/23/2022 at x1 am  Yes Yes   Sig: Take 12.5 mg by mouth 2 times daily   multivitamin w/minerals (THERA-VIT-M) tablet 11/23/2022  Yes Yes   Sig: Take 1 tablet by mouth daily   nitroGLYcerin (NITROSTAT) 0.4 MG sublingual tablet   Yes Yes   Sig: Place 0.4 mg under the tongue every 5 minutes as needed for  chest pain For chest pain place 1 tablet under the tongue every 5 minutes for 3 doses. If symptoms persist 5 minutes after 1st dose call 911.   potassium chloride ER (KLOR-CON M) 10 MEQ CR tablet 11/23/2022  Yes Yes   Sig: Take 20 mEq by mouth daily   tamsulosin (FLOMAX) 0.4 MG capsule 11/23/2022  Yes Yes   Sig: Take 0.4 mg by mouth daily   warfarin ANTICOAGULANT (COUMADIN) 3 MG tablet Past Week at 11/20  Yes Yes   Sig: Take 3 mg by mouth See Admin Instructions Take 1.5 mg Wednesday and 3 mg all other days.      Facility-Administered Medications: None     Allergies   Not on File    Physical Exam   Vital Signs: Temp: 98.3  F (36.8  C) Temp src: Oral BP: (!) 170/86 Pulse: 87   Resp: 27 SpO2: 97 % O2 Device: None (Room air)    Weight: 0 lbs 0 oz       GENERAL: The patient is not in any acute distressed. Awake and alert.  HEENT: Nonicteric sclerae, PERRLA, EOMI. Oropharynx clear. Moist mucous membranes. Conjunctivae appear well perfused.  HEART: Regular rate and rhythm without murmurs.  LUNGS: Clear to auscultation bilaterally. No wheezing or crackles.  ABDOMEN: Soft, positive bowel sounds, nontender.  SKIN: No rash, no excessive bruising, petechiae, or purpura.  EXTREMITIES : no rashes, no swelling in legs.  NEUROLOGIC: conscious and not  Oriented, partially  follows commands  ROS: All other systems negative       Data   Data reviewed today: I reviewed all medications, new labs and imaging results over the last 24 hours. I personally reviewed no images or EKG's today.    Recent Labs   Lab 11/23/22  1624 11/23/22  1411 11/23/22  0654 11/21/22  0858   WBC  --  11.6*  --   --    HGB  --  15.6  --   --    MCV  --  105*  --   --    PLT  --  258  --   --    INR  --  1.88* 1.93* 6.13*   * 154*  --   --    POTASSIUM  --  4.7  --   --    CHLORIDE  --  113*  --   --    CO2  --  28  --   --    BUN  --  57.2*  --   --    CR  --  1.86*  --   --    ANIONGAP  --  13  --   --    COLLIN  --  9.8  --   --    GLC  --  139*  --   --     ALBUMIN  --  4.2  --   --    PROTTOTAL  --  7.7  --   --    BILITOTAL  --  0.5  --   --    ALKPHOS  --  135*  --   --    ALT  --  112*  --   --    AST  --  49  --   --

## 2022-11-24 NOTE — PLAN OF CARE
Physical Therapy: Orders received. Chart reviewed and discussed with care team.? Physical Therapy not indicated due to per chart review patient is wheelchair bound at baseline and uses a lift for transfers. No need for skilled physical therapy services at this time.? Defer discharge recommendations to medical team.? Will complete orders.      If medical status changes, please reorder PT as appropriate.    Ivory Bowden, PT, DPT

## 2022-11-25 NOTE — PROGRESS NOTES
I spoke with Corrie Balbuena from [facility]. She spoke with daughterof patient, Ana and [facility] will sign patient on to hospice on 11/28. [facility] if [facility] returns to [place] over week end , [phone].

## 2022-11-25 NOTE — PROGRESS NOTES
Patient has [facility] stretcher ride to return to [facility] on 11/28 at 1100. PCS done. Giselle at [facility] notified at . Lola at [facility] [place] notified at . Daughter, Ana updated.

## 2022-11-25 NOTE — PROGRESS NOTES
[facility]    Medicine Progress Note - Hospitalist Service    Date of Admission:  11/23/2022    Assessment & Plan     Connor Rea is a 87 year old male admitted on 11/23/2022. He has been admitted from Nursing home. He has a h/o Dementia. He was found to be more confused on day of admit  and was also found to have hypotension as well as hypoxia. He did have recent COVID infection around 10 days back and has been having failure to thrive following the COVID infection.  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  The visual ejection fraction is 25-30%.  Septal motion is consistent with conduction abnormality.  There is severe septal hypokinesis.  There is inferolateral wall akinesis.  There is inferior wall akinesis.  Mildly decreased right ventricular systolic function  There is mild (1+) mitral regurgitation.  There is trace to mild  aortic regurgitation.     Goals of care--no escalation to ICU  -get hospice consult, I think hospice would be beneficial to see prior to going back to NH         Diet: Combination Diet Regular Diet Adult    DVT Prophylaxis: Warfarin  Haji Catheter: Not present  Central Lines: None  Cardiac Monitoring: None  Code Status: No CPR- Do NOT Intubate      Disposition Plan   On ivf still  Hospice consult        The patient's care was discussed with the Care Coordinator/ and Patient.I called daughter today--they have plan to meet with hospice at NH on Monday    Che Dumont MD  Hospitalist Service  [facility]  Securely message with the Vocera Web Console (learn more here)  Text page via [employer] Paging/Directory           ______________________________________________________________________    Interval History   He was more responsive this am  He was pleasant and had no pain  '    Data reviewed today: I reviewed all medications, new labs and imaging results over the last 24 hours. I personally reviewed labs this am pending    Physical Exam   Vital Signs: Temp: 98  F (36.7  C) Temp src: Oral BP: (!) 142/63 Pulse: 60   Resp: 20 SpO2: 96 % O2 Device: None (Room air)    Weight: 175 lbs 9.6 oz  Constitutional: awake, fatigued, alert, cooperative and no apparent distress  Eyes: sclera clear  Respiratory: No increased work of breathing, good air exchange, clear to auscultation bilaterally, no crackles or wheezing  Cardiovascular: Normal apical impulse, regular rate and rhythm, normal S1 and S2, no S3 or S4, and no murmur noted  GI: normal bowel sounds, soft, non-distended and non-tender  Skin: no bruising or bleeding  Musculoskeletal: no lower extremity pitting edema present  Neurologic: Mental Status Exam:  Level of Alertness:   awake  Motor Exam:  Weak all over still  Neuropsychiatric: Affect: normal and pleasant    Data   Recent Labs   Lab 11/25/22  0153 11/24/22  2226 11/24/22  1492  22  0924 22  0544 22  1624 22  1411 22  0654 22  0858   WBC  --   --   --   --  8.9  --  11.6*  --   --    HGB  --   --   --   --  12.4*  --  15.6  --   --    MCV  --   --   --   --  104*  --  105*  --   --    PLT  --   --   --   --  192  --  258  --   --    INR  --   --   --   --   --   --  1.88* 1.93* 6.13*   * 146* 148*   < > 150*  150*   < > 154*  --   --    POTASSIUM  --   --   --   --  4.1  4.1  --  4.7  --   --    CHLORIDE  --   --   --   --  115*  115*  --  113*  --   --    CO2  --   --   --   --  26  26  --  28  --   --    BUN  --   --   --   --  48.9*  --  57.2*  --   --    CR  --   --   --   --  1.61*  --  1.86*  --   --    ANIONGAP  --   --   --   --  9  9  --  13  --   --    COLLIN  --   --   --   --  8.7*  --  9.8  --   --    GLC  --   --   --   --  141*  --  139*  --   --    ALBUMIN  --   --   --   --  3.4*  --  4.2  --   --    PROTTOTAL  --   --   --   --  5.9*  --  7.7  --   --    BILITOTAL  --   --   --   --  0.3  --  0.5  --   --    ALKPHOS  --   --   --   --  102  --  135*  --   --    ALT  --   --   --   --  81*  --  112*  --   --    AST  --   --   --   --  34  --  49  --   --     < > = values in this interval not displayed.     Recent Results (from the past 24 hour(s))   Echocardiogram Complete   Result Value    LVEF  25-30%    [facility]    [id number]  [id number]  [id number]  [id number]^NANCY^[place]ANDR[place] [address]     Name: ROMARIO ISIDRO  MRN: [id number]  : 1935  Study Date: 2022 10:30 AM  Age: 87 yrs  Gender: Male  Patient Location: [facility]  Reason For Study: CAD  Ordering Physician: SANDRA CRUZ  Performed By: ACE     BSA: 2.0 m2  Height: 69 in  Weight: 175 lb  HR: 55  BP: 134/65 mmHg  ______________________________________________________________________________  Procedure  Complete Echo Adult. Definity (NDC #22329-399) given intravenously. 2mL  given.  ______________________________________________________________________________  Interpretation Summary     The left atrium is mildly dilated.  The visual ejection fraction is 25-30%.  Septal motion is consistent with conduction abnormality.  There is severe septal hypokinesis.  There is inferolateral wall akinesis.  There is inferior wall akinesis.  Mildly decreased right ventricular systolic function  There is mild (1+) mitral regurgitation.  There is trace to mild aortic regurgitation.  ______________________________________________________________________________  Left Ventricle  The left ventricle is normal in size. There is normal left ventricular wall  thickness. Diastolic Doppler findings (E/E' ratio and/or other parameters)  suggest left ventricular filling pressures are indeterminate. The visual  ejection fraction is 25-30%. Septal motion is consistent with conduction  abnormality. There is severe septal hypokinesis. There is inferolateral wall  akinesis. There is inferior wall akinesis.     Right Ventricle  The right ventricle is normal size. Mildly decreased right ventricular  systolic function.     Atria  The left atrium is mildly dilated. Right atrial size is normal. Intact atrial  septum.     Mitral Valve  Mitral valve leaflets appear normal. There is mild mitral annular  calcification. There is mild (1+) mitral regurgitation.     Tricuspid Valve  The tricuspid valve is not well visualized, but is grossly normal. There is  trace tricuspid regurgitation. Right ventricular systolic pressure could not  be approximated due to inadequate tricuspid regurgitation.     Aortic Valve  The aortic valve is trileaflet with aortic valve sclerosis. There is trace to  mild aortic regurgitation. No aortic stenosis is present.     Pulmonic Valve  The pulmonic valve is not well visualized. There is no pulmonic valvular  regurgitation.     Vessels  The aorta root is normal. Normal size ascending aorta. IVC  diameter <2.1 cm  collapsing >50% with sniff suggests a normal RA pressure of 3 mmHg.     Pericardium  There is no pericardial effusion.     ______________________________________________________________________________  MMode/2D Measurements & Calculations  IVSd: 0.88 cm  LVIDd: 5.0 cm  LVIDs: 4.2 cm  LVPWd: 0.83 cm  FS: 16.2 %     LV mass(C)d: 149.7 grams  LV mass(C)dI: 76.7 grams/m2  Ao root diam: 3.3 cm  LVOT diam: 2.1 cm  LVOT area: 3.5 cm2  LA Volume Indexed (AL/bp): 34.4 ml/m2  RWT: 0.33     Time Measurements  MM HR: 55.0 BPM     Doppler Measurements & Calculations  MV E max daniel: 47.1 cm/sec  MV A max daniel: 70.8 cm/sec  MV E/A: 0.67  MV dec slope: 180.6 cm/sec2  MV dec time: 0.26 sec  Ao V2 max: 121.9 cm/sec  Ao max P.0 mmHg  Ao V2 mean: 85.2 cm/sec  Ao mean PG: 3.3 mmHg  Ao V2 VTI: 26.1 cm  HENRIQUE(I,D): 2.7 cm2  HENRIQUE(V,D): 2.8 cm2  AI P1/2t: 625.0 msec  LV V1 max PG: 3.9 mmHg  LV V1 max: 98.5 cm/sec  LV V1 VTI: 20.6 cm  SV(LVOT): 71.7 ml  SI(LVOT): 36.8 ml/m2  PA acc time: 0.08 sec  AV Daniel Ratio (DI): 0.81  HENRIQUE Index (cm2/m2): 1.4  E/E': 5.6  E/E' av.2  Lateral E/e': 5.6  Medial E/e': 18.8  Peak E' Daniel: 8.4 cm/sec     ______________________________________________________________________________  Report approved by: Carlitos Eng 2022 11:39 AM

## 2022-11-25 NOTE — PLAN OF CARE
"  Problem: Plan of Care - These are the overarching goals to be used throughout the patient stay.    Goal: Absence of Hospital-Acquired Illness or Injury  Intervention: Identify and Manage Fall Risk  Recent Flowsheet Documentation  Taken 11/24/2022 1615 by Tari Sommer RN  Safety Promotion/Fall Prevention:    room near nurse's station    room door open    fall prevention program maintained    clutter free environment maintained     Problem: Plan of Care - These are the overarching goals to be used throughout the patient stay.    Goal: Optimal Comfort and Wellbeing  Outcome: Progressing   Goal Outcome Evaluation:    Repositioned for comfort/maintain skin integrity.  Patient resistive with reposition and taking medications.  Took meds with encouragement.  Denied discomfort/appears comfortable.  Slept intermittently. Poor appetite.  Resist encouragement to eat. Repeated \"No\".    Monitoring Sodium level q 4 hours.  Na+ level 148 and 146.  Temp: (P) 97.6  F (36.4  C) Temp src: (P) Axillary BP: (!) 146/67 Pulse: 69   Resp: (P) 20 SpO2: (P) 96 % O2 Device: (P) None (Room air)    Continue to monitor.                          "

## 2022-11-26 NOTE — PLAN OF CARE
Patient orientated to self only. VSS on RA. Up with myrtle; repositioning t/o hs. Denies pain. Incontinent bladder; external cath in place.  Refused PM medication; BP remains WNL.   Encouraging fluid intake. Fall precautions in place, bed alarm on.    Goal Outcome Evaluation:  Problem: Plan of Care - These are the overarching goals to be used throughout the patient stay.    Goal: Absence of Hospital-Acquired Illness or Injury  Intervention: Identify and Manage Fall Risk  Recent Flowsheet Documentation  Taken 11/25/2022 2220 by Guera Abdullahi, RN  Safety Promotion/Fall Prevention:    bed alarm on    fall prevention program maintained    increased rounding and observation    room door open    room near nurse's station  Problem: Risk for Delirium  Goal: Improved Sleep  Outcome: Progressing

## 2022-11-26 NOTE — PLAN OF CARE
"  Problem: Plan of Care - These are the overarching goals to be used throughout the patient stay.    Goal: Patient-Specific Goal (Individualized)  Description: You can add care plan individualizations to a care plan. Examples of Individualization might be:  \"Parent requests to be called daily at 9am for status\", \"I have a hard time hearing out of my right ear\", or \"Do not touch me to wake me up as it startles me\".  Outcome: Progressing     Problem: Plan of Care - These are the overarching goals to be used throughout the patient stay.    Goal: Optimal Comfort and Wellbeing  Outcome: Progressing     Problem: Risk for Delirium  Goal: Improved Attention and Thought Clarity  Outcome: Progressing    Pt was more alert today per family. VSS stable. PRN Tylenol was given once for grimacing during repositioning. Up to chair for two hours with myrtle lift. No BM. Dressing change done to coccyx wound. Needs assistance with eating.                            "

## 2022-11-26 NOTE — PROGRESS NOTES
[facility]    Medicine Progress Note - Hospitalist Service    Date of Admission:  11/23/2022    Assessment & Plan     Connor Rea is a 87 year old male admitted on 11/23/2022. He has been admitted from Nursing home. He has a h/o Dementia. He was found to be more confused on day of admit  and was also found to have hypotension as well as hypoxia. He did have recent COVID infection around 10 days back and has been having failure to thrive following the COVID infection.  Here he was diagnosed with hypernatremia as well as MAYANK     1.Hypernatremia, dehydration, NA on admit 155 high  -suspect took less intake with covid  -D5W til late 11/25, then off  -labs this am na 146  -discussed with daughter Ana this am , that what we did was temporary for him. I have suggested hospice for him and she agreed--no more NA checks her and no more IVF here  -ok to encourage him to take po    2.MAYANK  -creat was 1.86, baseline closer to 1  -improved to 1.1 with IVF , now off  -avoid nephrotoxins  -holding lasix  -holding Ace-i     3.acute metabolic encephalopathy  -from hypernatremia, MAYANK and at risk with dementia  -ct neg acute  -hx of old cva's     4.Suspect vascular dementia  -chronic left side weak  -on warfarin  -impressive ct     5.Elevated LFT's  -suspect related to Covid  -trending down     6.Leukocytosis  -on admit 11.6, suspect related to reactive nature of dehydration  -today wbc 7.3     7.Chronic edema in legs  -holding lasix      8.HTN  -holding ace-I and lasix  -on metoprolol     9.BPH  -flomax     10.hx of cad  -s/p cabg     11.Recent Covid  --13 days ago, so recovered     12.Pos trop-->new dx systolic HF--not in exacerbation  -hx of cad  -now likely trop leak  -check echo  -The left atrium is mildly dilated.  The visual ejection fraction is 25-30%.  Septal motion is consistent with conduction abnormality.  There is severe septal hypokinesis.  There is inferolateral wall akinesis.  There is  inferior wall akinesis.  Mildly decreased right ventricular systolic function  There is mild (1+) mitral regurgitation.  There is trace to mild aortic regurgitation.    13.Urine retention  -straight cath this am  -monitor close  -on med     Goals of care--no escalation to ICU  -will go back to NH Monday  -[facility] will see Monday at NH  -for here -no more ivf, no more check on Sodium          Diet: Combination Diet Regular Diet Adult    DVT Prophylaxis: Warfarin  Haji Catheter: Not present  Central Lines: None  Cardiac Monitoring: None  Code Status: No CPR- Do NOT Intubate      Disposition Plan   Here til Monday, NH will not take back til then--will see Hospice at NH        The patient's care was discussed with the Patient and Patient's Family. I called daughter Ana at 1200 to update and discussed goals, no more ivf, no more Sodium checks. Hospice to see at NH on Monday(NH will take him back Monday, would not take back over weekend)    Che Dumont MD  Hospitalist Service  [facility]  Securely message with the Vocera Web Console (learn more here)  Text page via [employer] Paging/Directory         ______________________________________________________________________    Interval History   He has no c/o  Denied pain at all  I tried to ask what his favorite drink was and he just says he does not know      Data reviewed today: I reviewed all medications, new labs and imaging results over the last 24 hours. I personally reviewed labs    Physical Exam   Vital Signs: Temp: 97.9  F (36.6  C) Temp src: Oral BP: 136/68 Pulse: 63   Resp: 18 SpO2: 97 % O2 Device: None (Room air)    Weight: 175 lbs 9.6 oz  Constitutional: awake, fatigued, alert, cooperative and no apparent distress  Respiratory: No increased work of breathing, good air exchange, clear to auscultation bilaterally, no crackles or wheezing  Cardiovascular: Normal apical impulse, regular rate and rhythm, normal S1 and S2,  no S3 or S4, and no murmur noted  GI: normal bowel sounds, soft, non-distended and non-tender  Skin: no bruising or bleeding  Musculoskeletal: no lower extremity pitting edema present  Neurologic: Mental Status Exam:  Level of Alertness:   awake  Language:  normal  Motor Exam:  Right side weak  Neuropsychiatric: General: normal, calm and normal eye contact    Data   Recent Labs   Lab 11/26/22  0712 11/25/22  2225 11/25/22  1734 11/25/22  1408 11/25/22  0850 11/24/22  0924 11/24/22  0544 11/23/22  1624 11/23/22  1411   WBC 7.3  --   --   --  7.8  --  8.9  --  11.6*   HGB 12.7*  --   --   --  12.6*  --  12.4*  --  15.6   *  --   --   --  103*  --  104*  --  105*     --   --   --  168  --  192  --  258   INR 3.52*  --   --   --  3.18*  --   --   --  1.88*   * 142 143   < > 148*  148*   < > 150*  150*   < > 154*   POTASSIUM 3.9  --   --   --  4.3  --  4.1  4.1  --  4.7   CHLORIDE 112*  --   --   --  114*  --  115*  115*  --  113*   CO2 26  --   --   --  27  --  26  26  --  28   BUN 27.5*  --   --   --  33.9*  --  48.9*  --  57.2*   CR 1.14  --   --   --  1.12  --  1.61*  --  1.86*   ANIONGAP 8  --   --   --  7  --  9  9  --  13   COLLIN 8.8  --   --   --  8.7*  --  8.7*  --  9.8   *  --   --   --  119*  --  141*  --  139*   ALBUMIN 3.3*  --   --   --  3.1*  --  3.4*  --  4.2   PROTTOTAL 6.1*  --   --   --  6.1*  --  5.9*  --  7.7   BILITOTAL 0.4  --   --   --  0.4  --  0.3  --  0.5   ALKPHOS 105  --   --   --  102  --  102  --  135*   ALT 76*  --   --   --  71*  --  81*  --  112*   AST 39  --   --   --  36  --  34  --  49    < > = values in this interval not displayed.     No results found for this or any previous visit (from the past 24 hour(s)).

## 2022-11-26 NOTE — PLAN OF CARE
Problem: Plan of Care - These are the overarching goals to be used throughout the patient stay.    Goal: Plan of Care Review  Description: The Plan of Care Review/Shift note should be completed every shift.  The Outcome Evaluation is a brief statement about your assessment that the patient is improving, declining, or no change.  This information will be displayed automatically on your shift note.  Outcome: Not Progressing   Goal Outcome Evaluation:  Pt continues to have difficulty urinating, had a straight cath done this am at 0815 for 625cc. Bladder scan done at 1445 for 221, Dr Dumont notified, will not straight cath unless greater than 400cc. Assisted with meals and oral intake encouraged, pt has a poor appetite and intake.

## 2022-11-27 NOTE — PLAN OF CARE
Problem: Plan of Care - These are the overarching goals to be used throughout the patient stay.    Goal: Plan of Care Review  Description: The Plan of Care Review/Shift note should be completed every shift.  The Outcome Evaluation is a brief statement about your assessment that the patient is improving, declining, or no change.  This information will be displayed automatically on your shift note.  Outcome: Progressing   Goal Outcome Evaluation:  Pt has been alert with confusion to time and place.  Total assist with all ADL's and encouraged to eat and drink.   Pt was unable to void, coude thomas catheter inserted and 600cc of brandon urine returned. Thomas to remain in place due to multiple straight cath episodes.   Plan is to return tomorrow to [facility] on hospice.

## 2022-11-27 NOTE — PLAN OF CARE
Goal Outcome Evaluation:    Problem: Acute Kidney Injury/Impairment  Goal: Fluid and Electrolyte Balance  Outcome: Progressing     No pain reported. Patient only oriented to self. Patient refused all food except for half of an applesauce. Pt drank 320mL of water. Pt slept most of shift.    Shanta Weeks RN

## 2022-11-27 NOTE — PLAN OF CARE
Patient orientated to self only. VSS on RA. Up with myrtle; repositioning t/o hs. Denies pain; dementia scale 0. Incontinent bladder, external catheter in place.   Encouraging oral intake.  Fall precautions in place. Bed alarm on.     0530: no void since previous straight cath. Bladder scan 205. Patient states he does not feel urge to void. Will monitor.    Goal Outcome Evaluation:  Problem: Acute Kidney Injury/Impairment  Goal: Effective Renal Function  Outcome: Not Progressing   Problem: Plan of Care - These are the overarching goals to be used throughout the patient stay.    Goal: Absence of Hospital-Acquired Illness or Injury  Intervention: Identify and Manage Fall Risk  Recent Flowsheet Documentation  Taken 11/27/2022 0015 by Guera Abdullahi RN  Safety Promotion/Fall Prevention:    bed alarm on    fall prevention program maintained    increased rounding and observation    room door open    room near nurse's station

## 2022-11-27 NOTE — PROGRESS NOTES
[facility] Progress Note      ASSESSMENT:    Principal Problem:    Acute renal failure superimposed on chronic kidney disease, unspecified CKD stage, unspecified acute renal failure type (H)  Active Problems:    Hypernatremia      PLAN:   87 year old male with history of dementia, recent COVID, HTN and BPH presents with failure to thrive following recent COVID infection and found to have hypernatremia and MAYANK       Hypernatremia, dehydration, NA on admit 155    -- improved with hypotonic hydration   -- stop trending       MAYANK: resolved with hydration  -holding lasix  -holding ACE(I)       Acute metabolic encephalopathy  -from hypernatremia, MAYANK and underlying vascular dementia  -CT head negative for anything acute       Vascular dementia  -chronic left side weakness  -continue warfarin       Elevated LFT's  -suspect related to Covid  -trending down       Leukocytosis: resolved  -suspect reactive and due to dehydration        Chronic carlos   -holding lasix as above       HTN  -holding ace-I and lasix as above  -continue metoprolol     BPH and now with acute urine retention:   -given recurrent needs for SIC, will order to place thomas 11/27  -continue flomax       Hx of CAD s/p cabg       Recent Covid  - recovered       Pos trop-->new dx systolic HF--not in exacerbation  -hx of cad  TTE shows  The visual ejection fraction is 25-30%.  Septal motion is consistent with conduction abnormality.  There is severe septal hypokinesis.  There is inferolateral wall akinesis.  There is inferior wall akinesis.  Mildly decreased right ventricular systolic function  There is mild (1+) mitral regurgitation.  There is trace to mild aortic regurgitation.  -- plan supportive cares on hospice          Goals of care--no escalations  -will go back to NH tomorrow and will assume hospice cares after discharge           DVT PPX: Warfarin                 Onofre Renteria  "D.O.              -------------------------------------------------------------------------------------------------------------  SUBJECTIVE: NAD. Denies any complaints.     Exam:  /65 (BP Location: Left arm)   Pulse 67   Temp 98.3  F (36.8  C) (Oral)   Resp 18   Ht 1.702 m (5' 7\")   Wt 79.7 kg (175 lb 9.6 oz)   SpO2 93%   BMI 27.50 kg/m    General: NAD  RESPIRATORY: Breathing nonlabored  CARDIOVASCULAR: + le edema bilat.   NEUROLOGIC: Alert, speech clear       Diagnostics Reviewed:      Recent Results (from the past 24 hour(s))   INR    Collection Time: 11/27/22  6:53 AM   Result Value Ref Range    INR 3.52 (H) 0.85 - 1.15   Extra Green Top (Lithium Heparin) Tube    Collection Time: 11/27/22  6:53 AM   Result Value Ref Range    Hold Specimen JIC        "

## 2022-11-28 NOTE — PROGRESS NOTES
Pt discharged via stretcher by [facility] [place] [facility]. Returning to [facility].

## 2022-11-28 NOTE — PLAN OF CARE
Patient orientated to self only. VSS on RA. Up with myrtle; repositioning throughout shift. Denies pain; dementia scale 0.   Redness and open wound to sacrum; mepilex in place.  Haji in place.  Encouraging oral intake. Patient does well with direction and assist with feeding.  Fall precautions in place. Bed alarm on.    Plan to discharge today.    Goal Outcome Evaluation:  Problem: Acute Kidney Injury/Impairment  Goal: Optimal Nutrition Intake  Outcome: Progressing  Problem: Plan of Care - These are the overarching goals to be used throughout the patient stay.    Goal: Absence of Hospital-Acquired Illness or Injury  Intervention: Identify and Manage Fall Risk  Recent Flowsheet Documentation  Taken 11/28/2022 0029 by Guera Abdullahi, RN  Safety Promotion/Fall Prevention:    bed alarm on    fall prevention program maintained    increased rounding and observation    room door open    room near nurse's station

## 2022-11-28 NOTE — PROGRESS NOTES
Care Management Discharge Note    Discharge Date: 11/28/2022       Discharge Disposition:  Back to [facility]    Discharge Services:  [facility]    Discharge DME:  Nothing new    Discharge Transportation: [facility] (PCS complete) at 1100    Private pay costs discussed: Not applicable    PAS Confirmation Code:    Patient/family educated on Medicare website which has current facility and service quality ratings:      Education Provided on the Discharge Plan:    Persons Notified of Discharge Plans: yes  Patient/Family in Agreement with the Plan:      Handoff Referral Completed: Yes    Additional Information:  Family and pt accepting of the discharge plan. Alexa faxed the discharge orders. No further CM needs.        Brianna Fowler RN

## 2022-11-28 NOTE — PLAN OF CARE
Problem: Plan of Care - These are the overarching goals to be used throughout the patient stay.    Goal: Plan of Care Review  Description: The Plan of Care Review/Shift note should be completed every shift.  The Outcome Evaluation is a brief statement about your assessment that the patient is improving, declining, or no change.  This information will be displayed automatically on your shift note.  Outcome: Adequate for Care Transition   Goal Outcome Evaluation:       Pt is returning to [facility] for continued care and will be on hospice. Haji catheter in place and draining brandon urine. Mempelix intact on his coccyx. Clothing and glasses sent with pt.

## 2022-11-28 NOTE — DISCHARGE SUMMARY
[facility] MEDICINE  DISCHARGE SUMMARY      Primary Care Physician: No Ref-Primary, Physician              Admission Date: 11/23/2022    Discharge Provider: Onofre Renteria DO Discharge Date: 11/28/2022    Diet: see discharge orders below Code Status: No CPR- Do NOT Intubate    Activity: as tolerated       Condition at Discharge: Stable      REASON FOR ADMISSION (See Admission Note for Details)      MAYANK, hypernatremia    PRINCIPAL DISCHARGE DIAGNOSIS    Principal Problem:    Acute renal failure superimposed on chronic kidney disease, unspecified CKD stage, unspecified acute renal failure type (H)  Active Problems:    Hypernatremia        SIGNIFICANT FINDINGS (Imaging, labs):      See below    PENDING LABS      None    PROCEDURES ( this hospitalization only)       None    RECOMMENDATION FOR F/U VISIT      See below    DISPOSITION ( home, home care, TCU...)      Hospice facility    SUMMARY OF HOSPITAL COURSE:       87 year old male with history of dementia, recent COVID, HTN and BPH presents with failure to thrive following recent COVID infection and found to have hypernatremia and MAYANK         Goals of care--no escalations  -will go back to NH and will assume hospice cares after discharge        Hypernatremia, dehydration, NA on admit 155    -- improved with hypotonic hydration   -- stop trending         MAYANK: resolved with hydration  --holding lasix and ACE(I) after discharge        Acute metabolic encephalopathy  -from hypernatremia, MAYANK and underlying vascular dementia  -CT head negative for anything acute        Vascular dementia  -chronic left side weakness  -continue warfarin        Elevated LFT's  -suspect related to Covid  -trending down        Leukocytosis: resolved  -suspect reactive and due to dehydration        Chronic carlos   -holding lasix as above        HTN  -holding ace-I and lasix as above  -continue metoprolol     BPH and now with acute urine  retention:   -given recurrent needs for SIC, placed thomas 11/27 and will keep in place after dischage  -continue flomax        Hx of CAD s/p cabg        Recent Covid  - recovered       Pos trop-->new dx systolic HF--not in exacerbation  -hx of cad  TTE shows  The visual ejection fraction is 25-30%.  Septal motion is consistent with conduction abnormality.  There is severe septal hypokinesis.  There is inferolateral wall akinesis.  There is inferior wall akinesis.  Mildly decreased right ventricular systolic function  There is mild (1+) mitral regurgitation.  There is trace to mild aortic regurgitation.  -- plan supportive cares on hospice            Discharge Medications with Med changes:         Review of your medicines      CONTINUE these medicines which may have CHANGED, or have new prescriptions. If we are uncertain of the size of tablets/capsules you have at home, strength may be listed as something that might have changed.      Dose / Directions   acetaminophen 500 MG tablet  Commonly known as: TYLENOL  This may have changed: Another medication with the same name was removed. Continue taking this medication, and follow the directions you see here.      Dose: 1,000 mg  Take 1,000 mg by mouth daily as needed for mild pain  Refills: 0        CONTINUE these medicines which have NOT CHANGED      Dose / Directions   Flomax 0.4 MG capsule  Generic drug: tamsulosin      Dose: 0.4 mg  Take 0.4 mg by mouth daily  Refills: 0     menthol-zinc oxide 0.44-20.6 % Oint ointment  Commonly known as: CALMOSEPTINE      Apply topically 3 times daily Apply to buttocks/coccyx  Refills: 0     metoprolol tartrate 25 MG tablet  Commonly known as: LOPRESSOR      Dose: 12.5 mg  Take 12.5 mg by mouth 2 times daily  Refills: 0     multivitamin w/minerals tablet      Dose: 1 tablet  Take 1 tablet by mouth daily  Refills: 0     nitroGLYcerin 0.4 MG sublingual tablet  Commonly known as: NITROSTAT      Dose: 0.4 mg  Place 0.4 mg under the  tongue every 5 minutes as needed for chest pain For chest pain place 1 tablet under the tongue every 5 minutes for 3 doses. If symptoms persist 5 minutes after 1st dose call 911.  Refills: 0     warfarin ANTICOAGULANT 3 MG tablet  Commonly known as: COUMADIN      Dose: 3 mg  Take 3 mg by mouth See Admin Instructions Take 1.5 mg Wednesday and 3 mg all other days.  Refills: 0        STOP taking    furosemide 40 MG tablet  Commonly known as: LASIX        lisinopril 10 MG tablet  Commonly known as: ZESTRIL        potassium chloride ER 10 MEQ CR tablet  Commonly known as: KLOR-CON M                         Anticoagulation Information       Recent INR results:   Recent Labs   Lab 11/28/22  0624 11/27/22  0653 11/26/22  0712 11/25/22  0850 11/23/22  1411 11/23/22  0654   INR 2.68* 3.52* 3.52* 3.18* 1.88* 1.93*     Warfarin doses (if applicable) or name of other anticoagulant:  Warfarin had been held since 11/25, ok to resume home dosing after discharge          Discharge Procedure Orders   Follow Up and recommended labs and tests   Order Comments: Follow up with Nursing home physician as needed     Activity - Up with nursing assistance     Order Specific Question Answer Comments   Is discharge order? Yes      Reason for your hospital stay   Order Comments: hypernatremia and MAYANK     Haji catheter   Order Comments: To straight gravity drainage. Change catheter every 2 weeks and PRN for leaking or decreased urine output with signs of bladder distention. DO NOT change catheter without a specific Provider order IF diagnosis of benign prostatic hypertrophy (BPH), neurogenic bladder, or other urological conditions     General info for SNF   Order Comments: Length of Stay Estimate: Long Term Care  Condition at Discharge: Stable  Rehabilitation Potential: Fair  Admission H&P remains valid and up-to-date: Yes  Recent Chemotherapy: N/A  Use Nursing Home Standing Orders: Yes     No CPR- Do NOT Intubate     Order Specific Question  "Answer Comments   Code status determined by: Other (please document)      Diet   Order Comments: Follow this diet upon discharge: Orders Placed This Encounter      Combination Diet Regular Diet Adult     Order Specific Question Answer Comments   Is discharge order? Yes          Subjective       NAD. Denies complaints     Examination      Vital Signs in last 24 hours:   Vital signs:  Temp: 98.5  F (36.9  C) Temp src: Oral BP: (!) 165/70 Pulse: 58   Resp: 22 SpO2: 98 % O2 Device: None (Room air)   Height: 170.2 cm (5' 7\") Weight: 79.7 kg (175 lb 9.6 oz)  Estimated body mass index is 27.5 kg/m  as calculated from the following:    Height as of this encounter: 1.702 m (5' 7\").    Weight as of this encounter: 79.7 kg (175 lb 9.6 oz).            General: NAD  RESPIRATORY: Respirations nonlabored  CARDIOVASCULAR: No le edema bilat.  NEUROLOGIC: No focal arm or leg weakness, speech is clear      Please see EMR for more detailed significant labs, imaging, consultant notes etc.  Total time spent on discharge: >30 minutes    Onofre Renteria D.O.        CC:No Ref-Primary, Physician        "

## 2023-10-01 PROBLEM — N17.9 ACUTE RENAL FAILURE SUPERIMPOSED ON CHRONIC KIDNEY DISEASE (H): Status: ACTIVE | Noted: 2022-01-01

## 2023-10-01 PROBLEM — N18.9 ACUTE RENAL FAILURE SUPERIMPOSED ON CHRONIC KIDNEY DISEASE (H): Status: ACTIVE | Noted: 2022-01-01
